# Patient Record
Sex: FEMALE | Race: WHITE | NOT HISPANIC OR LATINO | Employment: OTHER | URBAN - METROPOLITAN AREA
[De-identification: names, ages, dates, MRNs, and addresses within clinical notes are randomized per-mention and may not be internally consistent; named-entity substitution may affect disease eponyms.]

---

## 2021-05-24 DIAGNOSIS — M25.561 RIGHT KNEE PAIN, UNSPECIFIED CHRONICITY: Primary | ICD-10-CM

## 2021-05-26 ENCOUNTER — HOSPITAL ENCOUNTER (OUTPATIENT)
Dept: OTHER | Facility: HOSPITAL | Age: 61
Discharge: HOME | End: 2021-05-26
Attending: ORTHOPAEDIC SURGERY

## 2021-05-26 ENCOUNTER — OFFICE VISIT (OUTPATIENT)
Dept: ORTHOPEDIC SURGERY | Facility: CLINIC | Age: 61
End: 2021-05-26

## 2021-05-26 VITALS — WEIGHT: 229 LBS | HEIGHT: 67 IN | TEMPERATURE: 97.5 F | BODY MASS INDEX: 35.94 KG/M2

## 2021-05-26 DIAGNOSIS — M25.562 LEFT KNEE PAIN, UNSPECIFIED CHRONICITY: Primary | ICD-10-CM

## 2021-05-26 PROCEDURE — 99203 OFFICE O/P NEW LOW 30 MIN: CPT | Performed by: ORTHOPAEDIC SURGERY

## 2021-05-26 RX ORDER — TRAZODONE HYDROCHLORIDE 150 MG/1
TABLET ORAL
COMMUNITY
End: 2021-08-26 | Stop reason: SDUPTHER

## 2021-05-26 RX ORDER — LISINOPRIL 10 MG/1
TABLET ORAL
COMMUNITY
Start: 2021-05-07 | End: 2021-08-26 | Stop reason: SDUPTHER

## 2021-05-26 RX ORDER — TRAZODONE HYDROCHLORIDE 150 MG/1
TABLET ORAL
COMMUNITY
Start: 2021-04-26 | End: 2021-05-26 | Stop reason: SDUPTHER

## 2021-05-26 RX ORDER — MILNACIPRAN HYDROCHLORIDE 100 MG/1
TABLET, FILM COATED ORAL
COMMUNITY
Start: 2021-05-19 | End: 2021-08-26 | Stop reason: SDUPTHER

## 2021-05-26 RX ORDER — GABAPENTIN 100 MG/1
200 CAPSULE ORAL 2 TIMES DAILY
COMMUNITY
Start: 2021-05-14

## 2021-05-26 RX ORDER — PRIMIDONE 50 MG/1
TABLET ORAL
COMMUNITY
Start: 2021-05-19 | End: 2021-08-26 | Stop reason: SDUPTHER

## 2021-05-26 RX ORDER — MELOXICAM 7.5 MG/1
TABLET ORAL
Qty: 40 TABLET | Refills: 3 | Status: SHIPPED | OUTPATIENT
Start: 2021-05-26 | End: 2021-08-11 | Stop reason: SDUPTHER

## 2021-05-26 RX ORDER — OMEPRAZOLE 40 MG/1
CAPSULE, DELAYED RELEASE ORAL
COMMUNITY
Start: 2021-03-22 | End: 2021-08-26 | Stop reason: SDUPTHER

## 2021-05-26 RX ORDER — ACETAMINOPHEN AND CODEINE PHOSPHATE 300; 15 MG/1; MG/1
TABLET ORAL
COMMUNITY
Start: 2021-05-20

## 2021-05-26 RX ORDER — ALBUTEROL SULFATE 90 UG/1
AEROSOL, METERED RESPIRATORY (INHALATION)
COMMUNITY
Start: 2021-05-19 | End: 2021-08-26 | Stop reason: SDUPTHER

## 2021-05-26 RX ORDER — DICLOFENAC SODIUM 20 MG/G
2 SOLUTION TOPICAL 2 TIMES DAILY PRN
Qty: 112 G | Refills: 3 | Status: SHIPPED | OUTPATIENT
Start: 2021-05-26 | End: 2022-03-10

## 2021-05-26 RX ORDER — TIZANIDINE 4 MG/1
8 TABLET ORAL NIGHTLY
COMMUNITY
Start: 2021-04-26

## 2021-05-26 RX ORDER — MONTELUKAST SODIUM 10 MG/1
TABLET ORAL
COMMUNITY
Start: 2021-05-11 | End: 2021-08-26 | Stop reason: SDUPTHER

## 2021-05-26 RX ORDER — MELOXICAM 7.5 MG/1
7.5 TABLET ORAL DAILY
Qty: 40 TABLET | Refills: 3 | Status: CANCELLED | OUTPATIENT
Start: 2021-05-26

## 2021-05-26 RX ORDER — NALOXONE HYDROCHLORIDE 4 MG/.1ML
SPRAY NASAL
COMMUNITY
Start: 2021-05-14

## 2021-05-26 RX ORDER — FLUTICASONE PROPIONATE 50 MCG
SPRAY, SUSPENSION (ML) NASAL
COMMUNITY
Start: 2021-05-11 | End: 2021-08-26 | Stop reason: SDUPTHER

## 2021-06-04 PROBLEM — M25.562 LEFT KNEE PAIN: Status: ACTIVE | Noted: 2021-06-04

## 2021-08-11 DIAGNOSIS — M25.562 LEFT KNEE PAIN, UNSPECIFIED CHRONICITY: ICD-10-CM

## 2021-08-11 RX ORDER — MELOXICAM 7.5 MG/1
TABLET ORAL
Qty: 40 TABLET | Refills: 3 | Status: SHIPPED | OUTPATIENT
Start: 2021-08-11 | End: 2021-08-26 | Stop reason: SDUPTHER

## 2021-08-26 ENCOUNTER — OFFICE VISIT (OUTPATIENT)
Dept: ORTHOPEDIC SURGERY | Facility: CLINIC | Age: 61
End: 2021-08-26

## 2021-08-26 VITALS — BODY MASS INDEX: 36.88 KG/M2 | WEIGHT: 235 LBS | TEMPERATURE: 97.8 F | HEIGHT: 67 IN

## 2021-08-26 DIAGNOSIS — M25.562 LEFT KNEE PAIN, UNSPECIFIED CHRONICITY: Primary | ICD-10-CM

## 2021-08-26 PROCEDURE — 99214 OFFICE O/P EST MOD 30 MIN: CPT | Performed by: ORTHOPAEDIC SURGERY

## 2021-08-26 RX ORDER — FLUTICASONE PROPIONATE 50 MCG
2 SPRAY, SUSPENSION (ML) NASAL DAILY
COMMUNITY
Start: 2021-08-11

## 2021-08-26 RX ORDER — ALBUTEROL SULFATE 90 UG/1
2 AEROSOL, METERED RESPIRATORY (INHALATION) EVERY 4 HOURS PRN
COMMUNITY
Start: 2021-03-03

## 2021-08-26 RX ORDER — MILNACIPRAN HYDROCHLORIDE 100 MG/1
100 TABLET, FILM COATED ORAL
COMMUNITY
Start: 2021-07-31 | End: 2022-03-10

## 2021-08-26 RX ORDER — PRIMIDONE 50 MG/1
50 TABLET ORAL 2 TIMES DAILY
COMMUNITY

## 2021-08-26 RX ORDER — OMEPRAZOLE 40 MG/1
40 CAPSULE, DELAYED RELEASE ORAL DAILY
COMMUNITY
Start: 2021-08-06

## 2021-08-26 RX ORDER — FLECAINIDE ACETATE 50 MG/1
50 TABLET ORAL 2 TIMES DAILY
COMMUNITY
Start: 2021-05-29

## 2021-08-26 RX ORDER — MELOXICAM 7.5 MG/1
TABLET ORAL
COMMUNITY
Start: 2021-08-11 | End: 2021-11-01

## 2021-08-26 RX ORDER — LISINOPRIL 10 MG/1
10 TABLET ORAL DAILY
COMMUNITY
Start: 2021-08-11

## 2021-08-26 RX ORDER — TRAZODONE HYDROCHLORIDE 150 MG/1
50 TABLET ORAL NIGHTLY PRN
Status: ON HOLD | COMMUNITY
Start: 2021-06-30 | End: 2022-03-11

## 2021-08-26 RX ORDER — MONTELUKAST SODIUM 10 MG/1
10 TABLET ORAL NIGHTLY
COMMUNITY
Start: 2021-03-03

## 2021-09-01 RX ORDER — PREGABALIN 75 MG/1
150 CAPSULE ORAL ONCE
Status: CANCELLED | OUTPATIENT
Start: 2022-02-11 | End: 2021-09-01

## 2021-09-01 RX ORDER — CHLORHEXIDINE GLUCONATE 500 MG/1
CLOTH TOPICAL 2 TIMES DAILY
Status: CANCELLED | OUTPATIENT
Start: 2021-09-01

## 2021-09-01 RX ORDER — CEFAZOLIN SODIUM 2 G/100ML
2 INJECTION, SOLUTION INTRAVENOUS ONCE
Status: CANCELLED | OUTPATIENT
Start: 2022-02-11 | End: 2021-09-01

## 2021-09-01 RX ORDER — MELOXICAM 7.5 MG/1
15 TABLET ORAL ONCE
Status: CANCELLED | OUTPATIENT
Start: 2022-02-11 | End: 2021-09-01

## 2021-09-01 RX ORDER — ACETAMINOPHEN 325 MG/1
1000 TABLET ORAL ONCE
Status: CANCELLED | OUTPATIENT
Start: 2022-02-11 | End: 2021-09-01

## 2021-10-05 PROBLEM — R74.8 ELEVATED ALKALINE PHOSPHATASE LEVEL: Status: ACTIVE | Noted: 2017-08-17

## 2021-10-05 PROBLEM — M62.81 HAND MUSCLE WEAKNESS: Status: ACTIVE | Noted: 2020-05-07

## 2021-10-05 PROBLEM — R39.13 INTERMITTENT URINARY STREAM: Status: ACTIVE | Noted: 2020-12-14

## 2021-10-05 PROBLEM — F09 COGNITIVE DYSFUNCTION: Status: ACTIVE | Noted: 2018-04-11

## 2021-10-05 PROBLEM — G51.4 EYELID MYOKYMIA: Status: ACTIVE | Noted: 2017-02-27

## 2021-10-05 PROBLEM — F32.9 MDD (MAJOR DEPRESSIVE DISORDER): Status: ACTIVE | Noted: 2018-01-23

## 2021-10-05 PROBLEM — J15.9 BACTERIAL PNEUMONIA: Status: ACTIVE | Noted: 2018-03-08

## 2021-10-05 PROBLEM — M53.3 SACROILIAC JOINT PAIN: Status: ACTIVE | Noted: 2018-12-17

## 2021-10-05 PROBLEM — G60.8: Status: ACTIVE | Noted: 2018-04-11

## 2021-10-05 PROBLEM — R49.0 DYSPHONIA: Status: ACTIVE | Noted: 2018-01-10

## 2021-10-05 PROBLEM — G47.33 OBSTRUCTIVE SLEEP APNEA SYNDROME: Status: ACTIVE | Noted: 2019-07-29

## 2021-10-05 PROBLEM — N39.41 URGENCY INCONTINENCE: Status: ACTIVE | Noted: 2020-12-14

## 2021-10-05 PROBLEM — U07.1 COVID-19: Status: ACTIVE | Noted: 2021-02-23

## 2021-10-05 PROBLEM — E34.9 ELEVATED PARATHYROID HORMONE: Status: ACTIVE | Noted: 2020-06-16

## 2021-10-05 PROBLEM — N64.4 BREAST PAIN: Status: ACTIVE | Noted: 2019-04-29

## 2021-10-05 PROBLEM — R06.2 WHEEZING: Status: ACTIVE | Noted: 2021-05-24

## 2021-10-05 PROBLEM — B00.1 RECURRENT COLD SORES: Status: ACTIVE | Noted: 2017-02-24

## 2021-10-05 PROBLEM — E66.9 OBESITY: Status: ACTIVE | Noted: 2021-04-12

## 2021-10-05 PROBLEM — R13.10 DYSPHAGIA: Status: ACTIVE | Noted: 2021-03-03

## 2021-10-05 PROBLEM — R47.01 APHASIA OF UNKNOWN ORIGIN: Status: ACTIVE | Noted: 2021-02-23

## 2021-10-05 PROBLEM — R49.8 VOCAL TREMOR: Status: ACTIVE | Noted: 2017-12-07

## 2021-10-05 PROBLEM — R79.89 ELEVATED PARATHYROID HORMONE: Status: ACTIVE | Noted: 2020-06-16

## 2021-10-05 PROBLEM — F41.9 ANXIETY: Status: ACTIVE | Noted: 2018-01-23

## 2021-10-05 PROBLEM — H52.4 BILATERAL PRESBYOPIA: Status: ACTIVE | Noted: 2018-12-17

## 2021-10-05 PROBLEM — R63.1 EXCESSIVE THIRST: Status: ACTIVE | Noted: 2019-10-28

## 2021-10-05 PROBLEM — R39.198 VOIDING DIFFICULTY: Status: ACTIVE | Noted: 2020-12-14

## 2021-10-05 PROBLEM — R35.1 NOCTURIA: Status: ACTIVE | Noted: 2020-12-14

## 2021-10-05 PROBLEM — M19.90 OSTEOARTHRITIS: Status: ACTIVE | Noted: 2019-04-29

## 2021-10-05 PROBLEM — H52.13 BILATERAL MYOPIA: Status: ACTIVE | Noted: 2018-12-17

## 2021-11-01 RX ORDER — MELOXICAM 7.5 MG/1
7.5 TABLET ORAL 2 TIMES DAILY PRN
Qty: 60 TABLET | Refills: 1 | Status: SHIPPED | OUTPATIENT
Start: 2021-11-01 | End: 2022-01-06 | Stop reason: SDUPTHER

## 2021-11-22 ENCOUNTER — TELEPHONE (OUTPATIENT)
Dept: ORTHOPEDIC SURGERY | Facility: CLINIC | Age: 61
End: 2021-11-22

## 2021-11-22 NOTE — TELEPHONE ENCOUNTER
Caller: Renetta Mak    Relationship to patient: Self    Best call back number: [phone]    Patient is needing: PATIENT IS CALLING TO SCHEDULE HER KNEE SURGERY

## 2022-01-06 DIAGNOSIS — M25.562 LEFT KNEE PAIN, UNSPECIFIED CHRONICITY: Primary | ICD-10-CM

## 2022-01-06 RX ORDER — MELOXICAM 7.5 MG/1
TABLET ORAL
Qty: 60 TABLET | Refills: 1 | Status: SHIPPED | OUTPATIENT
Start: 2022-01-06 | End: 2022-03-12 | Stop reason: HOSPADM

## 2022-02-18 ENCOUNTER — TELEPHONE (OUTPATIENT)
Dept: ORTHOPEDIC SURGERY | Facility: CLINIC | Age: 62
End: 2022-02-18

## 2022-02-18 NOTE — TELEPHONE ENCOUNTER
Caller:NANETTE     Relationship:  [facility]    Best call back number: [phone] OPTION 5    Requested Prescriptions: MELOXICAM: SEE DETAILS BELOW  Requested Prescriptions      No prescriptions requested or ordered in this encounter        Pharmacy where request should be sent:  [facility]:    Additional details provided by patient: PHARMACIST STATES PER PATIENTS INSURANCE: PATIENTS RX STRENGTH MUST BE CHANGED BASED ON FOLLOWING INFO: NEEDS TO BE PRESCRIBED #30- 15 MG MELOXICAM: TAKING EITHER 1 TAB DAILY OR 1/2 TAB TWICE DAILY: INSURANCE WILL NO LONGER COVER 7.5 STRENGTH: IF GIVING A VERBAL ORDER: WILL NEED TO SPEAK WITH THE PHARMACIST     Does the patient have less than a 3 day supply:  [x] Yes  [] No    Barb Ling   02/18/22 13:47 EST

## 2022-02-21 DIAGNOSIS — Z96.652 S/P TKR (TOTAL KNEE REPLACEMENT), LEFT: Primary | ICD-10-CM

## 2022-02-25 ENCOUNTER — TELEPHONE (OUTPATIENT)
Dept: ORTHOPEDIC SURGERY | Facility: CLINIC | Age: 62
End: 2022-02-25

## 2022-02-25 NOTE — TELEPHONE ENCOUNTER
HUB STAFF ATTEMPTED NON-CLINICAL WARM TRANSFER - NO ANSWER     Caller: Renetta Mak    Relationship: Self    Best call back number: [phone]     Who are you requesting to speak with (clinical staff, provider, specific staff member):   PROCEDURE      What was the call regarding: PATIENT STATED HER 02/11/22 LEFT TOTAL KNEE REPLACEMENT WAS RESCHEDULED TO 03/11/22 - PATIENT NEEDS A NEW PRE-ADMISSION TESTING DATE & TO ALSO HAVE INITIAL 2 WEEK POST OP RESCHEDULED FROM TODAY 02/25/22 @ 1100 TO AROUND 03/25/22    Do you require a callback: YES     THANKS

## 2022-03-08 ENCOUNTER — TELEPHONE (OUTPATIENT)
Dept: ORTHOPEDIC SURGERY | Facility: CLINIC | Age: 62
End: 2022-03-08

## 2022-03-08 NOTE — TELEPHONE ENCOUNTER
PATIENT STATES THAT SHE HAS HAD BRONCHITIS OVER THE LAST WEEK BUT THAT SHE FEELS LIKE SHE IS DOING BETTER. SHE IS WONDERING WHAT SHE SHOULD DO. I ADVISED HER THAT IF SHE IS FEELING BETTER TO GO ON TO HER PRE-OP APPOINTMENT AT [facility] ON Thursday AND THAT IF THEY FEEL THAT SHE IS NOT SAFE TO PROCEED THEN WE WILL GET HER RESCHEDULED. SHE HAS BEEN ON A STEROID PACK, WILL THAT AFFECT HER PROCEEDING?/AW

## 2022-03-10 ENCOUNTER — PRE-ADMISSION TESTING (OUTPATIENT)
Dept: PREADMISSION TESTING | Facility: HOSPITAL | Age: 62
End: 2022-03-10

## 2022-03-10 ENCOUNTER — HOSPITAL ENCOUNTER (OUTPATIENT)
Dept: GENERAL RADIOLOGY | Facility: HOSPITAL | Age: 62
Discharge: HOME | End: 2022-03-10

## 2022-03-10 VITALS
TEMPERATURE: 98.1 F | WEIGHT: 239 LBS | BODY MASS INDEX: 37.51 KG/M2 | RESPIRATION RATE: 18 BRPM | HEART RATE: 101 BPM | SYSTOLIC BLOOD PRESSURE: 138 MMHG | OXYGEN SATURATION: 97 % | HEIGHT: 67 IN | DIASTOLIC BLOOD PRESSURE: 78 MMHG

## 2022-03-10 DIAGNOSIS — Z96.652 S/P TKR (TOTAL KNEE REPLACEMENT), LEFT: ICD-10-CM

## 2022-03-10 DIAGNOSIS — M25.561 RIGHT KNEE PAIN, UNSPECIFIED CHRONICITY: ICD-10-CM

## 2022-03-10 DIAGNOSIS — M25.562 LEFT KNEE PAIN, UNSPECIFIED CHRONICITY: ICD-10-CM

## 2022-03-10 LAB
ANION GAP SERPL CALCULATED.3IONS-SCNC: 9.9 MMOL/L (ref 5–15)
BUN SERPL-MCNC: 16 MG/DL (ref 8–23)
BUN/CREAT SERPL: 15.7 (ref 7–25)
CALCIUM SPEC-SCNC: 9 MG/DL (ref 8.6–10.5)
CHLORIDE SERPL-SCNC: 105 MMOL/L (ref 98–107)
CO2 SERPL-SCNC: 25.1 MMOL/L (ref 22–29)
CREAT SERPL-MCNC: 1.02 MG/DL (ref 0.57–1)
DEPRECATED RDW RBC AUTO: 42.4 FL (ref 37–54)
EGFRCR SERPLBLD CKD-EPI 2021: 61.9 ML/MIN/1.73
ERYTHROCYTE [DISTWIDTH] IN BLOOD BY AUTOMATED COUNT: 12.2 % (ref 12.3–15.4)
GLUCOSE SERPL-MCNC: 52 MG/DL (ref 65–99)
HCT VFR BLD AUTO: 37.9 % (ref 34–46.6)
HGB BLD-MCNC: 12.3 G/DL (ref 12–15.9)
MCH RBC QN AUTO: 30.4 PG (ref 26.6–33)
MCHC RBC AUTO-ENTMCNC: 32.5 G/DL (ref 31.5–35.7)
MCV RBC AUTO: 93.6 FL (ref 79–97)
PLATELET # BLD AUTO: 367 10*3/MM3 (ref 140–450)
PMV BLD AUTO: 9.2 FL (ref 6–12)
POTASSIUM SERPL-SCNC: 3.9 MMOL/L (ref 3.5–5.2)
QT INTERVAL: 388 MS
RBC # BLD AUTO: 4.05 10*6/MM3 (ref 3.77–5.28)
SARS-COV-2 ORF1AB RESP QL NAA+PROBE: NOT DETECTED
SODIUM SERPL-SCNC: 140 MMOL/L (ref 136–145)
WBC NRBC COR # BLD: 7.51 10*3/MM3 (ref 3.4–10.8)

## 2022-03-10 PROCEDURE — 93005 ELECTROCARDIOGRAM TRACING: CPT

## 2022-03-10 PROCEDURE — 73560 X-RAY EXAM OF KNEE 1 OR 2: CPT

## 2022-03-10 PROCEDURE — 85027 COMPLETE CBC AUTOMATED: CPT

## 2022-03-10 PROCEDURE — C9803 HOPD COVID-19 SPEC COLLECT: HCPCS

## 2022-03-10 PROCEDURE — 36415 COLL VENOUS BLD VENIPUNCTURE: CPT

## 2022-03-10 PROCEDURE — 93010 ELECTROCARDIOGRAM REPORT: CPT | Performed by: INTERNAL MEDICINE

## 2022-03-10 PROCEDURE — U0004 COV-19 TEST NON-CDC HGH THRU: HCPCS

## 2022-03-10 PROCEDURE — 80048 BASIC METABOLIC PNL TOTAL CA: CPT

## 2022-03-10 RX ORDER — BUDESONIDE 0.5 MG/2ML
0.5 INHALANT ORAL DAILY
COMMUNITY

## 2022-03-10 RX ORDER — ALBUTEROL SULFATE 90 UG/1
2 AEROSOL, METERED RESPIRATORY (INHALATION) DAILY
Status: ON HOLD | COMMUNITY
End: 2022-03-11

## 2022-03-10 RX ORDER — DESVENLAFAXINE 100 MG/1
100 TABLET, EXTENDED RELEASE ORAL EVERY EVENING
COMMUNITY

## 2022-03-10 RX ORDER — MORPHINE SULFATE/PF 1 MG/2 ML
SYRINGE (ML) INTRAVENOUS
COMMUNITY

## 2022-03-10 RX ORDER — CHLORHEXIDINE GLUCONATE 500 MG/1
CLOTH TOPICAL 2 TIMES DAILY
Status: ACTIVE | OUTPATIENT
Start: 2022-03-10

## 2022-03-10 RX ORDER — CETIRIZINE HYDROCHLORIDE 10 MG/1
10 TABLET ORAL DAILY PRN
COMMUNITY

## 2022-03-10 ASSESSMENT — KOOS JR
KOOS JR SCORE: 36.931
KOOS JR SCORE: 20

## 2022-03-10 NOTE — DISCHARGE INSTRUCTIONS
Take the following medications the morning of surgery:  OMEPRAZOLE,GABAPENTIN,FLECAINIDE,PRIMADONE  USE INHALER AND BRING WITH YOU    If you are on prescription narcotic pain medication to control your pain you may also take that medication the morning of surgery.    General Instructions:  Do not eat solid food after midnight the night before surgery.  You may drink clear liquids day of surgery but must stop at least one hour before your hospital arrival time.  CLEAR LIQUIDS UNTIL 8:00 AM  It is beneficial for you to have a clear drink that contains carbohydrates the day of surgery.  We suggest a 12 to 20 ounce bottle of Gatorade or Powerade for non-diabetic patients or a 12 to 20 ounce bottle of G2 or Powerade Zero for diabetic patients. (Pediatric patients, are not advised to drink a 12 to 20 ounce carbohydrate drink)    Clear liquids are liquids you can see through.  Nothing red in color.     Plain water                               Sports drinks  Sodas                                   Gelatin (Jell-O)  Fruit juices without pulp such as white grape juice and apple juice  Popsicles that contain no fruit or yogurt  Tea or coffee (no cream or milk added)  Gatorade / Powerade  G2 / Powerade Zero    Infants may have breast milk up to four hours before surgery.  Infants drinking formula may drink formula up to six hours before surgery.   Patients who avoid smoking, chewing tobacco and alcohol for 4 weeks prior to surgery have a reduced risk of post-operative complications.  Quit smoking as many days before surgery as you can.  Do not smoke, use chewing tobacco or drink alcohol the day of surgery.   If applicable bring your C-PAP/ BI-PAP machine.  Bring any papers given to you in the doctor’s office.  Wear clean comfortable clothes.  Do not wear contact lenses, false eyelashes or make-up.  Bring a case for your glasses.   Bring crutches or walker if applicable.  Remove all piercings.  Leave jewelry and any other  valuables at home.  Hair extensions with metal clips must be removed prior to surgery.  The Pre-Admission Testing nurse will instruct you to bring medications if unable to obtain an accurate list in Pre-Admission Testing.        If you were given a blood bank ID arm band remember to bring it with you the day of surgery.    Preventing a Surgical Site Infection:  For 2 to 3 days before surgery, avoid shaving with a razor because the razor can irritate skin and make it easier to develop an infection.    Any areas of open skin can increase the risk of a post-operative wound infection by allowing bacteria to enter and travel throughout the body.  Notify your surgeon if you have any skin wounds / rashes even if it is not near the expected surgical site.  The area will need assessed to determine if surgery should be delayed until it is healed.  The night prior to surgery shower using a fresh bar of anti-bacterial soap (such as Dial) and clean washcloth.  Sleep in a clean bed with clean clothing.  Do not allow pets to sleep with you.  Shower on the morning of surgery using a fresh bar of anti-bacterial soap (such as Dial) and clean washcloth.  Dry with a clean towel and dress in clean clothing.  Ask your surgeon if you will be receiving antibiotics prior to surgery.  Make sure you, your family, and all healthcare providers clean their hands with soap and water or an alcohol based hand  before caring for you or your wound.    Day of surgery: 3/11/2022 ARRIVAL TIME 9:00 AM  Your arrival time is approximately two hours before your scheduled surgery time.  Upon arrival, a Pre-op nurse and Anesthesiologist will review your health history, obtain vital signs, and answer questions you may have.  The only belongings needed at this time will be a list of your home medications and if applicable your C-PAP/BI-PAP machine.  A Pre-op nurse will start an IV and you may receive medication in preparation for surgery, including  something to help you relax.     Please be aware that surgery does come with discomfort.  We want to make every effort to control your discomfort so please discuss any uncontrolled symptoms with your nurse.   Your doctor will most likely have prescribed pain medications.      If you are going home after surgery you will receive individualized written care instructions before being discharged.  A responsible adult must drive you to and from the hospital on the day of your surgery and stay with you for 24 hours.  Discharge prescriptions can be filled by the hospital pharmacy during regular pharmacy hours.  If you are having surgery late in the day/evening your prescription may be e-prescribed to your pharmacy.  Please verify your pharmacy hours or chose a 24 hour pharmacy to avoid not having access to your prescription because your pharmacy has closed for the day.    If you are staying overnight following surgery, you will be transported to your hospital room following the recovery period.  [facility] has all private rooms.    If you have any questions please call Pre-Admission Testing at [phone].  Deductibles and co-payments are collected on the day of service. Please be prepared to pay the required co-pay, deductible or deposit on the day of service as defined by your plan.    Patient Education for Self-Quarantine Process    Following your COVID testing, we strongly recommend that you wear a mask when you are with other people and practice social distancing.   Limit your activities to only required outings.  Wash your hands with soap and water frequently for at least 20 seconds.   Avoid touching your eyes, nose and mouth with unwashed hands.  Do not share anything - utensils, drinking glasses, food from the same bowl.   Sanitize household surfaces daily. Include all high touch areas (door handles, light switches, phones, countertops, etc.)    Call your surgeon immediately if you experience any  of the following symptoms:  Sore Throat  Shortness of Breath or difficulty breathing  Cough  Chills  Body soreness or muscle pain  Headache  Fever  New loss of taste or smell  Do not arrive for your surgery ill.  Your procedure will need to be rescheduled to another time.  You will need to call your physician before the day of surgery to avoid any unnecessary exposure to hospital staff as well as other patients.   CHLORHEXIDINE CLOTH INSTRUCTIONS  The morning of surgery follow these instructions using the Chlorhexidine cloths you've been given.  These steps reduce bacteria on the body.  Do not use the cloths near your eyes, ears mouth, genitalia or on open wounds.  Throw the cloths away after use but do not try to flush them down a toilet.      Open and remove one cloth at a time from the package.    Leave the cloth unfolded and begin the bathing.  Massage the skin with the cloths using gentle pressure to remove bacteria.  Do not scrub harshly.   Follow the steps below with one 2% CHG cloth per area (6 total cloths).  One cloth for neck, shoulders and chest.  One cloth for both arms, hands, fingers and underarms (do underarms last).  One cloth for the abdomen followed by groin.  One cloth for right leg and foot including between the toes.  One cloth for left leg and foot including between the toes.  The last cloth is to be used for the back of the neck, back and buttocks.    Allow the CHG to air dry 3 minutes on the skin which will give it time to work and decrease the chance of irritation.  The skin may feel sticky until it is dry.  Do not rinse with water or any other liquid or you will lose the beneficial effects of the CHG.  If mild skin irritation occurs, do rinse the skin to remove the CHG.  Report this to the nurse at time of admission.  Do not apply lotions, creams, ointments, deodorants or perfumes after using the clothes. Dress in clean clothes before coming to the hospital.    BACTROBAN NASAL  OINTMENT  There are many germs normally in your nose. Bactroban is an ointment that will help reduce these germs. Please follow these instructions for Bactroban use:      ____The day before surgery in the morning  Date__3/10______    ____The day before surgery in the evening              Date___3/10_____    ____The day of surgery in the morning    Date__3/11______    **Squirt ½ package of Bactroban Ointment onto a cotton applicator and apply to inside of 1st nostril.  Squirt the remaining Bactroban and apply to the inside of the other nostril.

## 2022-03-11 ENCOUNTER — APPOINTMENT (OUTPATIENT)
Dept: GENERAL RADIOLOGY | Facility: HOSPITAL | Age: 62
End: 2022-03-11

## 2022-03-11 ENCOUNTER — ANESTHESIA (OUTPATIENT)
Dept: PERIOP | Facility: HOSPITAL | Age: 62
End: 2022-03-11

## 2022-03-11 ENCOUNTER — HOSPITAL ENCOUNTER (OUTPATIENT)
Facility: HOSPITAL | Age: 62
Discharge: HOME | End: 2022-03-12
Attending: ORTHOPAEDIC SURGERY | Admitting: ORTHOPAEDIC SURGERY

## 2022-03-11 ENCOUNTER — ANESTHESIA EVENT (OUTPATIENT)
Dept: PERIOP | Facility: HOSPITAL | Age: 62
End: 2022-03-11

## 2022-03-11 DIAGNOSIS — Z96.652 S/P TKR (TOTAL KNEE REPLACEMENT), LEFT: Primary | ICD-10-CM

## 2022-03-11 DIAGNOSIS — M25.562 LEFT KNEE PAIN, UNSPECIFIED CHRONICITY: ICD-10-CM

## 2022-03-11 PROCEDURE — 25010000002 FENTANYL CITRATE (PF) 50 MCG/ML SOLUTION: Performed by: ANESTHESIOLOGY

## 2022-03-11 PROCEDURE — S0260 H&P FOR SURGERY: HCPCS | Performed by: ORTHOPAEDIC SURGERY

## 2022-03-11 PROCEDURE — 25010000002 CEFAZOLIN IN DEXTROSE 2-4 GM/100ML-% SOLUTION: Performed by: ORTHOPAEDIC SURGERY

## 2022-03-11 PROCEDURE — C1889 IMPLANT/INSERT DEVICE, NOC: HCPCS | Performed by: ORTHOPAEDIC SURGERY

## 2022-03-11 PROCEDURE — 25010000002 CEFAZOLIN PER 500 MG: Performed by: ORTHOPAEDIC SURGERY

## 2022-03-11 PROCEDURE — 25010000002 ROPIVACAINE PER 1 MG: Performed by: ANESTHESIOLOGY

## 2022-03-11 PROCEDURE — 25010000002 HYDROMORPHONE PER 4 MG: Performed by: NURSE ANESTHETIST, CERTIFIED REGISTERED

## 2022-03-11 PROCEDURE — 20985 CPTR-ASST DIR MS PX: CPT | Performed by: ORTHOPAEDIC SURGERY

## 2022-03-11 PROCEDURE — 25010000002 DEXAMETHASONE PER 1 MG: Performed by: NURSE ANESTHETIST, CERTIFIED REGISTERED

## 2022-03-11 PROCEDURE — 25010000002 ROPIVACAINE PER 1 MG: Performed by: ORTHOPAEDIC SURGERY

## 2022-03-11 PROCEDURE — 27447 TOTAL KNEE ARTHROPLASTY: CPT | Performed by: ORTHOPAEDIC SURGERY

## 2022-03-11 PROCEDURE — 0 BUPIVACAINE LIPOSOME 1.3 % SUSPENSION: Performed by: ORTHOPAEDIC SURGERY

## 2022-03-11 PROCEDURE — C1713 ANCHOR/SCREW BN/BN,TIS/BN: HCPCS | Performed by: ORTHOPAEDIC SURGERY

## 2022-03-11 PROCEDURE — 25010000002 ONDANSETRON PER 1 MG: Performed by: NURSE ANESTHETIST, CERTIFIED REGISTERED

## 2022-03-11 PROCEDURE — G0378 HOSPITAL OBSERVATION PER HR: HCPCS

## 2022-03-11 PROCEDURE — C1776 JOINT DEVICE (IMPLANTABLE): HCPCS | Performed by: ORTHOPAEDIC SURGERY

## 2022-03-11 PROCEDURE — 25010000002 NEOSTIGMINE 5 MG/10ML SOLUTION: Performed by: NURSE ANESTHETIST, CERTIFIED REGISTERED

## 2022-03-11 PROCEDURE — 25010000002 SUCCINYLCHOLINE PER 20 MG: Performed by: NURSE ANESTHETIST, CERTIFIED REGISTERED

## 2022-03-11 PROCEDURE — 73560 X-RAY EXAM OF KNEE 1 OR 2: CPT

## 2022-03-11 PROCEDURE — C9290 INJ, BUPIVACAINE LIPOSOME: HCPCS | Performed by: ORTHOPAEDIC SURGERY

## 2022-03-11 PROCEDURE — 76942 ECHO GUIDE FOR BIOPSY: CPT | Performed by: ORTHOPAEDIC SURGERY

## 2022-03-11 PROCEDURE — 25010000002 FENTANYL CITRATE (PF) 50 MCG/ML SOLUTION: Performed by: NURSE ANESTHETIST, CERTIFIED REGISTERED

## 2022-03-11 PROCEDURE — 25010000002 MIDAZOLAM PER 1 MG: Performed by: ANESTHESIOLOGY

## 2022-03-11 PROCEDURE — 25010000002 PROPOFOL 10 MG/ML EMULSION: Performed by: NURSE ANESTHETIST, CERTIFIED REGISTERED

## 2022-03-11 DEVICE — PAT KN PERSONA VE CRS/LNK CMT 9.5X38MM: Type: IMPLANTABLE DEVICE | Site: KNEE | Status: FUNCTIONAL

## 2022-03-11 DEVICE — VIOLET ANTIBACTERIAL POLYDIOXANONE, KNOTLESS TISSUE CONTROL DEVICE
Type: IMPLANTABLE DEVICE | Site: KNEE | Status: FUNCTIONAL
Brand: STRATAFIX

## 2022-03-11 DEVICE — IMPLANTABLE DEVICE
Type: IMPLANTABLE DEVICE | Site: KNEE | Status: FUNCTIONAL
Brand: PERSONA® NATURAL TIBIA®

## 2022-03-11 DEVICE — COMP FEM/KN PERSONA CMT NRW COCR SZ10 LT: Type: IMPLANTABLE DEVICE | Site: KNEE | Status: FUNCTIONAL

## 2022-03-11 DEVICE — SCRW HEX PERSONA 3.5X3.2X33MM: Type: IMPLANTABLE DEVICE | Site: KNEE | Status: FUNCTIONAL

## 2022-03-11 DEVICE — CMT BONE REFOBACIN R W/GENT 1X40: Type: IMPLANTABLE DEVICE | Site: KNEE | Status: FUNCTIONAL

## 2022-03-11 DEVICE — CAP EXT STEM KN UPCHRG: Type: IMPLANTABLE DEVICE | Site: KNEE | Status: FUNCTIONAL

## 2022-03-11 DEVICE — ART/SRF KN PERSONA/VE PS EF 8TO11 10MM LT: Type: IMPLANTABLE DEVICE | Site: KNEE | Status: FUNCTIONAL

## 2022-03-11 DEVICE — KNOTLESS TISSUE CONTROL DEVICE, VIOLET UNIDIRECTIONAL (ANTIBACTERIAL) SYNTHETIC ABSORBABLE DEVICE
Type: IMPLANTABLE DEVICE | Site: KNEE | Status: FUNCTIONAL
Brand: STRATAFIX

## 2022-03-11 DEVICE — CAP TOTL KN CMT PRIMARY: Type: IMPLANTABLE DEVICE | Site: KNEE | Status: FUNCTIONAL

## 2022-03-11 DEVICE — DEV CONTRL TISS STRATAFIX SPIRAL MNCRYL UD 3/0 PLS 30CM: Type: IMPLANTABLE DEVICE | Site: KNEE | Status: FUNCTIONAL

## 2022-03-11 RX ORDER — FLUTICASONE PROPIONATE 50 MCG
2 SPRAY, SUSPENSION (ML) NASAL DAILY
Status: DISCONTINUED | OUTPATIENT
Start: 2022-03-12 | End: 2022-03-12 | Stop reason: HOSPADM

## 2022-03-11 RX ORDER — SODIUM CHLORIDE, SODIUM LACTATE, POTASSIUM CHLORIDE, CALCIUM CHLORIDE 600; 310; 30; 20 MG/100ML; MG/100ML; MG/100ML; MG/100ML
9 INJECTION, SOLUTION INTRAVENOUS CONTINUOUS
Status: DISCONTINUED | OUTPATIENT
Start: 2022-03-11 | End: 2022-03-11

## 2022-03-11 RX ORDER — GLYCOPYRROLATE 0.2 MG/ML
INJECTION INTRAMUSCULAR; INTRAVENOUS AS NEEDED
Status: DISCONTINUED | OUTPATIENT
Start: 2022-03-11 | End: 2022-03-11 | Stop reason: SURG

## 2022-03-11 RX ORDER — FAMOTIDINE 10 MG/ML
20 INJECTION, SOLUTION INTRAVENOUS ONCE
Status: COMPLETED | OUTPATIENT
Start: 2022-03-11 | End: 2022-03-11

## 2022-03-11 RX ORDER — ONDANSETRON 2 MG/ML
4 INJECTION INTRAMUSCULAR; INTRAVENOUS EVERY 6 HOURS PRN
Status: DISCONTINUED | OUTPATIENT
Start: 2022-03-11 | End: 2022-03-12 | Stop reason: HOSPADM

## 2022-03-11 RX ORDER — MAGNESIUM HYDROXIDE 1200 MG/15ML
LIQUID ORAL AS NEEDED
Status: DISCONTINUED | OUTPATIENT
Start: 2022-03-11 | End: 2022-03-11 | Stop reason: HOSPADM

## 2022-03-11 RX ORDER — SODIUM CHLORIDE 0.9 % (FLUSH) 0.9 %
3 SYRINGE (ML) INJECTION EVERY 12 HOURS SCHEDULED
Status: DISCONTINUED | OUTPATIENT
Start: 2022-03-11 | End: 2022-03-11 | Stop reason: HOSPADM

## 2022-03-11 RX ORDER — FENTANYL CITRATE 50 UG/ML
50 INJECTION, SOLUTION INTRAMUSCULAR; INTRAVENOUS
Status: DISCONTINUED | OUTPATIENT
Start: 2022-03-11 | End: 2022-03-11 | Stop reason: HOSPADM

## 2022-03-11 RX ORDER — DIPHENHYDRAMINE HCL 25 MG
25 CAPSULE ORAL
Status: DISCONTINUED | OUTPATIENT
Start: 2022-03-11 | End: 2022-03-11 | Stop reason: HOSPADM

## 2022-03-11 RX ORDER — OXYCODONE HYDROCHLORIDE AND ACETAMINOPHEN 5; 325 MG/1; MG/1
1 TABLET ORAL EVERY 4 HOURS PRN
Status: DISCONTINUED | OUTPATIENT
Start: 2022-03-11 | End: 2022-03-12 | Stop reason: HOSPADM

## 2022-03-11 RX ORDER — EPHEDRINE SULFATE 50 MG/ML
5 INJECTION, SOLUTION INTRAVENOUS ONCE AS NEEDED
Status: DISCONTINUED | OUTPATIENT
Start: 2022-03-11 | End: 2022-03-11 | Stop reason: HOSPADM

## 2022-03-11 RX ORDER — ROPIVACAINE HYDROCHLORIDE 5 MG/ML
INJECTION, SOLUTION EPIDURAL; INFILTRATION; PERINEURAL
Status: COMPLETED | OUTPATIENT
Start: 2022-03-11 | End: 2022-03-11

## 2022-03-11 RX ORDER — MIDAZOLAM HYDROCHLORIDE 1 MG/ML
INJECTION INTRAMUSCULAR; INTRAVENOUS
Status: COMPLETED | OUTPATIENT
Start: 2022-03-11 | End: 2022-03-11

## 2022-03-11 RX ORDER — FLECAINIDE ACETATE 50 MG/1
50 TABLET ORAL 2 TIMES DAILY
Status: DISCONTINUED | OUTPATIENT
Start: 2022-03-11 | End: 2022-03-12 | Stop reason: HOSPADM

## 2022-03-11 RX ORDER — ONDANSETRON 2 MG/ML
4 INJECTION INTRAMUSCULAR; INTRAVENOUS ONCE AS NEEDED
Status: DISCONTINUED | OUTPATIENT
Start: 2022-03-11 | End: 2022-03-11 | Stop reason: HOSPADM

## 2022-03-11 RX ORDER — FENTANYL CITRATE 50 UG/ML
INJECTION, SOLUTION INTRAMUSCULAR; INTRAVENOUS AS NEEDED
Status: DISCONTINUED | OUTPATIENT
Start: 2022-03-11 | End: 2022-03-11 | Stop reason: SURG

## 2022-03-11 RX ORDER — FLUMAZENIL 0.1 MG/ML
0.2 INJECTION INTRAVENOUS AS NEEDED
Status: DISCONTINUED | OUTPATIENT
Start: 2022-03-11 | End: 2022-03-11 | Stop reason: HOSPADM

## 2022-03-11 RX ORDER — DESVENLAFAXINE SUCCINATE 50 MG/1
100 TABLET, EXTENDED RELEASE ORAL EVERY EVENING
Status: DISCONTINUED | OUTPATIENT
Start: 2022-03-11 | End: 2022-03-12 | Stop reason: HOSPADM

## 2022-03-11 RX ORDER — ACETAMINOPHEN 325 MG/1
1000 TABLET ORAL ONCE
Status: COMPLETED | OUTPATIENT
Start: 2022-03-11 | End: 2022-03-11

## 2022-03-11 RX ORDER — BISACODYL 10 MG
10 SUPPOSITORY, RECTAL RECTAL DAILY PRN
Status: DISCONTINUED | OUTPATIENT
Start: 2022-03-11 | End: 2022-03-12 | Stop reason: HOSPADM

## 2022-03-11 RX ORDER — SODIUM CHLORIDE 0.9 % (FLUSH) 0.9 %
3-10 SYRINGE (ML) INJECTION AS NEEDED
Status: DISCONTINUED | OUTPATIENT
Start: 2022-03-11 | End: 2022-03-11 | Stop reason: HOSPADM

## 2022-03-11 RX ORDER — HYDROMORPHONE HYDROCHLORIDE 1 MG/ML
0.5 INJECTION, SOLUTION INTRAMUSCULAR; INTRAVENOUS; SUBCUTANEOUS
Status: DISCONTINUED | OUTPATIENT
Start: 2022-03-11 | End: 2022-03-11 | Stop reason: HOSPADM

## 2022-03-11 RX ORDER — ALBUTEROL SULFATE 2.5 MG/3ML
2.5 SOLUTION RESPIRATORY (INHALATION) EVERY 4 HOURS PRN
Status: DISCONTINUED | OUTPATIENT
Start: 2022-03-11 | End: 2022-03-12 | Stop reason: HOSPADM

## 2022-03-11 RX ORDER — DEXTROSE MONOHYDRATE 25 G/50ML
25 INJECTION, SOLUTION INTRAVENOUS
Status: DISCONTINUED | OUTPATIENT
Start: 2022-03-11 | End: 2022-03-12 | Stop reason: HOSPADM

## 2022-03-11 RX ORDER — GABAPENTIN 100 MG/1
200 CAPSULE ORAL 2 TIMES DAILY
Status: DISCONTINUED | OUTPATIENT
Start: 2022-03-11 | End: 2022-03-12 | Stop reason: HOSPADM

## 2022-03-11 RX ORDER — ONDANSETRON 2 MG/ML
INJECTION INTRAMUSCULAR; INTRAVENOUS AS NEEDED
Status: DISCONTINUED | OUTPATIENT
Start: 2022-03-11 | End: 2022-03-11 | Stop reason: SURG

## 2022-03-11 RX ORDER — CEFAZOLIN SODIUM 2 G/100ML
2 INJECTION, SOLUTION INTRAVENOUS EVERY 8 HOURS
Status: COMPLETED | OUTPATIENT
Start: 2022-03-11 | End: 2022-03-12

## 2022-03-11 RX ORDER — PROMETHAZINE HYDROCHLORIDE 25 MG/1
25 SUPPOSITORY RECTAL ONCE AS NEEDED
Status: DISCONTINUED | OUTPATIENT
Start: 2022-03-11 | End: 2022-03-11 | Stop reason: HOSPADM

## 2022-03-11 RX ORDER — BISACODYL 5 MG/1
10 TABLET, DELAYED RELEASE ORAL DAILY PRN
Status: DISCONTINUED | OUTPATIENT
Start: 2022-03-11 | End: 2022-03-12 | Stop reason: HOSPADM

## 2022-03-11 RX ORDER — HYDRALAZINE HYDROCHLORIDE 20 MG/ML
5 INJECTION INTRAMUSCULAR; INTRAVENOUS
Status: DISCONTINUED | OUTPATIENT
Start: 2022-03-11 | End: 2022-03-11 | Stop reason: HOSPADM

## 2022-03-11 RX ORDER — MELOXICAM 7.5 MG/1
15 TABLET ORAL ONCE
Status: DISCONTINUED | OUTPATIENT
Start: 2022-03-11 | End: 2022-03-11 | Stop reason: HOSPADM

## 2022-03-11 RX ORDER — ALBUTEROL SULFATE 90 UG/1
2 AEROSOL, METERED RESPIRATORY (INHALATION) EVERY 4 HOURS PRN
Status: DISCONTINUED | OUTPATIENT
Start: 2022-03-11 | End: 2022-03-11 | Stop reason: CLARIF

## 2022-03-11 RX ORDER — LABETALOL HYDROCHLORIDE 5 MG/ML
INJECTION, SOLUTION INTRAVENOUS AS NEEDED
Status: DISCONTINUED | OUTPATIENT
Start: 2022-03-11 | End: 2022-03-11 | Stop reason: SURG

## 2022-03-11 RX ORDER — OXYCODONE HYDROCHLORIDE AND ACETAMINOPHEN 5; 325 MG/1; MG/1
2 TABLET ORAL EVERY 4 HOURS PRN
Status: DISCONTINUED | OUTPATIENT
Start: 2022-03-11 | End: 2022-03-12 | Stop reason: HOSPADM

## 2022-03-11 RX ORDER — MONTELUKAST SODIUM 10 MG/1
10 TABLET ORAL NIGHTLY
Status: DISCONTINUED | OUTPATIENT
Start: 2022-03-11 | End: 2022-03-12 | Stop reason: HOSPADM

## 2022-03-11 RX ORDER — PROPOFOL 10 MG/ML
VIAL (ML) INTRAVENOUS AS NEEDED
Status: DISCONTINUED | OUTPATIENT
Start: 2022-03-11 | End: 2022-03-11 | Stop reason: SURG

## 2022-03-11 RX ORDER — ROPIVACAINE HYDROCHLORIDE 5 MG/ML
INJECTION, SOLUTION EPIDURAL; INFILTRATION; PERINEURAL AS NEEDED
Status: DISCONTINUED | OUTPATIENT
Start: 2022-03-11 | End: 2022-03-11 | Stop reason: HOSPADM

## 2022-03-11 RX ORDER — OXYCODONE HYDROCHLORIDE AND ACETAMINOPHEN 5; 325 MG/1; MG/1
1-2 TABLET ORAL EVERY 4 HOURS PRN
Qty: 40 TABLET | Refills: 0 | Status: SHIPPED | OUTPATIENT
Start: 2022-03-11 | End: 2022-03-18 | Stop reason: SDUPTHER

## 2022-03-11 RX ORDER — LIDOCAINE HYDROCHLORIDE 20 MG/ML
INJECTION, SOLUTION INFILTRATION; PERINEURAL AS NEEDED
Status: DISCONTINUED | OUTPATIENT
Start: 2022-03-11 | End: 2022-03-11 | Stop reason: SURG

## 2022-03-11 RX ORDER — LABETALOL HYDROCHLORIDE 5 MG/ML
5 INJECTION, SOLUTION INTRAVENOUS
Status: DISCONTINUED | OUTPATIENT
Start: 2022-03-11 | End: 2022-03-11 | Stop reason: HOSPADM

## 2022-03-11 RX ORDER — TIZANIDINE 4 MG/1
8 TABLET ORAL NIGHTLY
Status: DISCONTINUED | OUTPATIENT
Start: 2022-03-11 | End: 2022-03-12 | Stop reason: HOSPADM

## 2022-03-11 RX ORDER — LISINOPRIL 20 MG/1
10 TABLET ORAL NIGHTLY
Status: DISCONTINUED | OUTPATIENT
Start: 2022-03-11 | End: 2022-03-12

## 2022-03-11 RX ORDER — LIDOCAINE HYDROCHLORIDE 10 MG/ML
0.5 INJECTION, SOLUTION EPIDURAL; INFILTRATION; INTRACAUDAL; PERINEURAL ONCE AS NEEDED
Status: DISCONTINUED | OUTPATIENT
Start: 2022-03-11 | End: 2022-03-11 | Stop reason: HOSPADM

## 2022-03-11 RX ORDER — FENTANYL CITRATE 50 UG/ML
100 INJECTION, SOLUTION INTRAMUSCULAR; INTRAVENOUS
Status: DISCONTINUED | OUTPATIENT
Start: 2022-03-11 | End: 2022-03-11 | Stop reason: HOSPADM

## 2022-03-11 RX ORDER — HYDROMORPHONE HCL 110MG/55ML
PATIENT CONTROLLED ANALGESIA SYRINGE INTRAVENOUS AS NEEDED
Status: DISCONTINUED | OUTPATIENT
Start: 2022-03-11 | End: 2022-03-11 | Stop reason: SURG

## 2022-03-11 RX ORDER — POLYETHYLENE GLYCOL 3350 17 G/17G
17 POWDER, FOR SOLUTION ORAL DAILY
Status: DISCONTINUED | OUTPATIENT
Start: 2022-03-11 | End: 2022-03-12 | Stop reason: HOSPADM

## 2022-03-11 RX ORDER — NICOTINE POLACRILEX 4 MG
15 LOZENGE BUCCAL
Status: DISCONTINUED | OUTPATIENT
Start: 2022-03-11 | End: 2022-03-12 | Stop reason: HOSPADM

## 2022-03-11 RX ORDER — OXYCODONE AND ACETAMINOPHEN 7.5; 325 MG/1; MG/1
2 TABLET ORAL EVERY 4 HOURS PRN
Status: DISCONTINUED | OUTPATIENT
Start: 2022-03-11 | End: 2022-03-11 | Stop reason: HOSPADM

## 2022-03-11 RX ORDER — PRIMIDONE 50 MG/1
50 TABLET ORAL 2 TIMES DAILY
Status: DISCONTINUED | OUTPATIENT
Start: 2022-03-11 | End: 2022-03-12 | Stop reason: HOSPADM

## 2022-03-11 RX ORDER — PROMETHAZINE HYDROCHLORIDE 25 MG/1
25 TABLET ORAL ONCE AS NEEDED
Status: DISCONTINUED | OUTPATIENT
Start: 2022-03-11 | End: 2022-03-11 | Stop reason: HOSPADM

## 2022-03-11 RX ORDER — FENTANYL CITRATE 50 UG/ML
INJECTION, SOLUTION INTRAMUSCULAR; INTRAVENOUS
Status: COMPLETED | OUTPATIENT
Start: 2022-03-11 | End: 2022-03-11

## 2022-03-11 RX ORDER — ONDANSETRON 4 MG/1
4 TABLET, FILM COATED ORAL EVERY 6 HOURS PRN
Status: DISCONTINUED | OUTPATIENT
Start: 2022-03-11 | End: 2022-03-12 | Stop reason: HOSPADM

## 2022-03-11 RX ORDER — MIDAZOLAM HYDROCHLORIDE 1 MG/ML
2 INJECTION INTRAMUSCULAR; INTRAVENOUS
Status: DISCONTINUED | OUTPATIENT
Start: 2022-03-11 | End: 2022-03-11 | Stop reason: HOSPADM

## 2022-03-11 RX ORDER — SUCCINYLCHOLINE CHLORIDE 20 MG/ML
INJECTION INTRAMUSCULAR; INTRAVENOUS AS NEEDED
Status: DISCONTINUED | OUTPATIENT
Start: 2022-03-11 | End: 2022-03-11 | Stop reason: SURG

## 2022-03-11 RX ORDER — ROCURONIUM BROMIDE 10 MG/ML
INJECTION, SOLUTION INTRAVENOUS AS NEEDED
Status: DISCONTINUED | OUTPATIENT
Start: 2022-03-11 | End: 2022-03-11 | Stop reason: SURG

## 2022-03-11 RX ORDER — NALOXONE HCL 0.4 MG/ML
0.2 VIAL (ML) INJECTION AS NEEDED
Status: DISCONTINUED | OUTPATIENT
Start: 2022-03-11 | End: 2022-03-11 | Stop reason: HOSPADM

## 2022-03-11 RX ORDER — TRANEXAMIC ACID 100 MG/ML
INJECTION, SOLUTION INTRAVENOUS AS NEEDED
Status: DISCONTINUED | OUTPATIENT
Start: 2022-03-11 | End: 2022-03-11 | Stop reason: SURG

## 2022-03-11 RX ORDER — CETIRIZINE HYDROCHLORIDE 10 MG/1
10 TABLET ORAL DAILY PRN
Status: DISCONTINUED | OUTPATIENT
Start: 2022-03-11 | End: 2022-03-12 | Stop reason: HOSPADM

## 2022-03-11 RX ORDER — DEXAMETHASONE SODIUM PHOSPHATE 10 MG/ML
INJECTION INTRAMUSCULAR; INTRAVENOUS AS NEEDED
Status: DISCONTINUED | OUTPATIENT
Start: 2022-03-11 | End: 2022-03-11 | Stop reason: SURG

## 2022-03-11 RX ORDER — ACETAMINOPHEN 325 MG/1
325 TABLET ORAL EVERY 4 HOURS PRN
Status: DISCONTINUED | OUTPATIENT
Start: 2022-03-11 | End: 2022-03-12 | Stop reason: HOSPADM

## 2022-03-11 RX ORDER — SODIUM CHLORIDE, SODIUM LACTATE, POTASSIUM CHLORIDE, CALCIUM CHLORIDE 600; 310; 30; 20 MG/100ML; MG/100ML; MG/100ML; MG/100ML
75 INJECTION, SOLUTION INTRAVENOUS CONTINUOUS
Status: DISCONTINUED | OUTPATIENT
Start: 2022-03-11 | End: 2022-03-12 | Stop reason: HOSPADM

## 2022-03-11 RX ORDER — PANTOPRAZOLE SODIUM 40 MG/1
40 TABLET, DELAYED RELEASE ORAL EVERY MORNING
Status: DISCONTINUED | OUTPATIENT
Start: 2022-03-12 | End: 2022-03-12 | Stop reason: HOSPADM

## 2022-03-11 RX ORDER — DOCUSATE SODIUM 100 MG/1
100 CAPSULE, LIQUID FILLED ORAL 2 TIMES DAILY
Status: DISCONTINUED | OUTPATIENT
Start: 2022-03-11 | End: 2022-03-12 | Stop reason: HOSPADM

## 2022-03-11 RX ORDER — BUDESONIDE 0.5 MG/2ML
0.5 INHALANT ORAL DAILY
Status: DISCONTINUED | OUTPATIENT
Start: 2022-03-11 | End: 2022-03-12 | Stop reason: HOSPADM

## 2022-03-11 RX ORDER — PREGABALIN 75 MG/1
150 CAPSULE ORAL ONCE
Status: COMPLETED | OUTPATIENT
Start: 2022-03-11 | End: 2022-03-11

## 2022-03-11 RX ORDER — NEOSTIGMINE METHYLSULFATE 0.5 MG/ML
INJECTION, SOLUTION INTRAVENOUS AS NEEDED
Status: DISCONTINUED | OUTPATIENT
Start: 2022-03-11 | End: 2022-03-11 | Stop reason: SURG

## 2022-03-11 RX ORDER — CEFAZOLIN SODIUM 2 G/100ML
2 INJECTION, SOLUTION INTRAVENOUS ONCE
Status: COMPLETED | OUTPATIENT
Start: 2022-03-11 | End: 2022-03-11

## 2022-03-11 RX ORDER — DIPHENHYDRAMINE HYDROCHLORIDE 50 MG/ML
12.5 INJECTION INTRAMUSCULAR; INTRAVENOUS
Status: DISCONTINUED | OUTPATIENT
Start: 2022-03-11 | End: 2022-03-11 | Stop reason: HOSPADM

## 2022-03-11 RX ORDER — ONDANSETRON 4 MG/1
4 TABLET, FILM COATED ORAL EVERY 6 HOURS PRN
Qty: 30 TABLET | Refills: 0 | Status: SHIPPED | OUTPATIENT
Start: 2022-03-11 | End: 2022-05-26

## 2022-03-11 RX ORDER — HYDROCODONE BITARTRATE AND ACETAMINOPHEN 7.5; 325 MG/1; MG/1
1 TABLET ORAL ONCE AS NEEDED
Status: DISCONTINUED | OUTPATIENT
Start: 2022-03-11 | End: 2022-03-11 | Stop reason: HOSPADM

## 2022-03-11 RX ADMIN — TRANEXAMIC ACID 1000 MG: 1 INJECTION, SOLUTION INTRAVENOUS at 14:56

## 2022-03-11 RX ADMIN — SUCCINYLCHOLINE CHLORIDE 160 MG: 20 INJECTION, SOLUTION INTRAMUSCULAR; INTRAVENOUS; PARENTERAL at 12:54

## 2022-03-11 RX ADMIN — HYDROMORPHONE HYDROCHLORIDE 0.5 MG: 1 INJECTION, SOLUTION INTRAMUSCULAR; INTRAVENOUS; SUBCUTANEOUS at 15:14

## 2022-03-11 RX ADMIN — MIDAZOLAM 2 MG: 1 INJECTION INTRAMUSCULAR; INTRAVENOUS at 11:07

## 2022-03-11 RX ADMIN — FLECAINIDE ACETATE TABLET 50 MG: 50 TABLET ORAL at 20:33

## 2022-03-11 RX ADMIN — PRIMIDONE 50 MG: 50 TABLET ORAL at 20:33

## 2022-03-11 RX ADMIN — GLYCOPYRROLATE 0.3 MG: 0.2 INJECTION INTRAMUSCULAR; INTRAVENOUS at 14:56

## 2022-03-11 RX ADMIN — CEFAZOLIN SODIUM 2 G: 2 INJECTION, SOLUTION INTRAVENOUS at 20:32

## 2022-03-11 RX ADMIN — FENTANYL CITRATE 25 MCG: 0.05 INJECTION, SOLUTION INTRAMUSCULAR; INTRAVENOUS at 13:28

## 2022-03-11 RX ADMIN — HYDROMORPHONE HYDROCHLORIDE 0.5 MG: 1 INJECTION, SOLUTION INTRAMUSCULAR; INTRAVENOUS; SUBCUTANEOUS at 15:38

## 2022-03-11 RX ADMIN — LABETALOL HYDROCHLORIDE 5 MG: 5 INJECTION, SOLUTION INTRAVENOUS at 14:17

## 2022-03-11 RX ADMIN — GLYCOPYRROLATE 0.2 MG: 0.2 INJECTION INTRAMUSCULAR; INTRAVENOUS at 13:58

## 2022-03-11 RX ADMIN — POLYETHYLENE GLYCOL 3350 17 G: 17 POWDER, FOR SOLUTION ORAL at 18:16

## 2022-03-11 RX ADMIN — OXYCODONE AND ACETAMINOPHEN 2 TABLET: 5; 325 TABLET ORAL at 19:43

## 2022-03-11 RX ADMIN — GABAPENTIN 200 MG: 100 CAPSULE ORAL at 20:33

## 2022-03-11 RX ADMIN — FENTANYL CITRATE 25 MCG: 0.05 INJECTION, SOLUTION INTRAMUSCULAR; INTRAVENOUS at 13:32

## 2022-03-11 RX ADMIN — DEXAMETHASONE SODIUM PHOSPHATE 8 MG: 10 INJECTION INTRAMUSCULAR; INTRAVENOUS at 12:57

## 2022-03-11 RX ADMIN — ONDANSETRON 4 MG: 2 INJECTION INTRAMUSCULAR; INTRAVENOUS at 14:53

## 2022-03-11 RX ADMIN — HYDROMORPHONE HYDROCHLORIDE 0.5 MG: 2 INJECTION, SOLUTION INTRAMUSCULAR; INTRAVENOUS; SUBCUTANEOUS at 13:39

## 2022-03-11 RX ADMIN — MONTELUKAST SODIUM 10 MG: 10 TABLET, FILM COATED ORAL at 20:33

## 2022-03-11 RX ADMIN — FAMOTIDINE 20 MG: 10 INJECTION INTRAVENOUS at 10:12

## 2022-03-11 RX ADMIN — PREGABALIN 150 MG: 75 CAPSULE ORAL at 10:06

## 2022-03-11 RX ADMIN — DESVENLAFAXINE SUCCINATE 100 MG: 50 TABLET, EXTENDED RELEASE ORAL at 18:16

## 2022-03-11 RX ADMIN — HYDROMORPHONE HYDROCHLORIDE 0.5 MG: 1 INJECTION, SOLUTION INTRAMUSCULAR; INTRAVENOUS; SUBCUTANEOUS at 16:15

## 2022-03-11 RX ADMIN — FENTANYL CITRATE 50 MCG: 50 INJECTION INTRAMUSCULAR; INTRAVENOUS at 15:14

## 2022-03-11 RX ADMIN — ACETAMINOPHEN 975 MG: 325 TABLET ORAL at 10:06

## 2022-03-11 RX ADMIN — LABETALOL HYDROCHLORIDE 5 MG: 5 INJECTION, SOLUTION INTRAVENOUS at 13:55

## 2022-03-11 RX ADMIN — SODIUM CHLORIDE, POTASSIUM CHLORIDE, SODIUM LACTATE AND CALCIUM CHLORIDE: 600; 310; 30; 20 INJECTION, SOLUTION INTRAVENOUS at 14:20

## 2022-03-11 RX ADMIN — FENTANYL CITRATE 50 MCG: 50 INJECTION INTRAMUSCULAR; INTRAVENOUS at 15:38

## 2022-03-11 RX ADMIN — FENTANYL CITRATE 50 MCG: 0.05 INJECTION, SOLUTION INTRAMUSCULAR; INTRAVENOUS at 13:26

## 2022-03-11 RX ADMIN — TIZANIDINE 8 MG: 4 TABLET ORAL at 20:33

## 2022-03-11 RX ADMIN — NEOSTIGMINE METHYLSULFATE 2.5 MG: 0.5 INJECTION INTRAVENOUS at 14:56

## 2022-03-11 RX ADMIN — SODIUM CHLORIDE, POTASSIUM CHLORIDE, SODIUM LACTATE AND CALCIUM CHLORIDE 75 ML/HR: 600; 310; 30; 20 INJECTION, SOLUTION INTRAVENOUS at 18:16

## 2022-03-11 RX ADMIN — CEFAZOLIN SODIUM 2 G: 2 INJECTION, SOLUTION INTRAVENOUS at 12:44

## 2022-03-11 RX ADMIN — ROCURONIUM BROMIDE 30 MG: 50 INJECTION INTRAVENOUS at 13:28

## 2022-03-11 RX ADMIN — HYDROMORPHONE HYDROCHLORIDE 0.5 MG: 2 INJECTION, SOLUTION INTRAMUSCULAR; INTRAVENOUS; SUBCUTANEOUS at 14:31

## 2022-03-11 RX ADMIN — MUPIROCIN 1 APPLICATION: 20 OINTMENT TOPICAL at 20:33

## 2022-03-11 RX ADMIN — PROPOFOL 200 MG: 10 INJECTION, EMULSION INTRAVENOUS at 12:54

## 2022-03-11 RX ADMIN — FENTANYL CITRATE 50 MCG: 0.05 INJECTION, SOLUTION INTRAMUSCULAR; INTRAVENOUS at 11:06

## 2022-03-11 RX ADMIN — ROPIVACAINE HYDROCHLORIDE 20 ML: 5 INJECTION, SOLUTION EPIDURAL; INFILTRATION; PERINEURAL at 11:11

## 2022-03-11 RX ADMIN — LIDOCAINE HYDROCHLORIDE 100 MG: 20 INJECTION, SOLUTION INFILTRATION; PERINEURAL at 12:54

## 2022-03-11 RX ADMIN — HYDROMORPHONE HYDROCHLORIDE 0.5 MG: 1 INJECTION, SOLUTION INTRAMUSCULAR; INTRAVENOUS; SUBCUTANEOUS at 16:37

## 2022-03-11 RX ADMIN — TRANEXAMIC ACID 1000 MG: 1 INJECTION, SOLUTION INTRAVENOUS at 13:08

## 2022-03-11 RX ADMIN — PROPOFOL 40 MG: 10 INJECTION, EMULSION INTRAVENOUS at 14:49

## 2022-03-11 RX ADMIN — DOCUSATE SODIUM 100 MG: 100 CAPSULE, LIQUID FILLED ORAL at 20:33

## 2022-03-11 RX ADMIN — LISINOPRIL 10 MG: 20 TABLET ORAL at 20:33

## 2022-03-11 RX ADMIN — SODIUM CHLORIDE, POTASSIUM CHLORIDE, SODIUM LACTATE AND CALCIUM CHLORIDE 9 ML/HR: 600; 310; 30; 20 INJECTION, SOLUTION INTRAVENOUS at 10:06

## 2022-03-11 NOTE — PLAN OF CARE
Goal Outcome Evaluation:  Plan of Care Reviewed With: patient        Progress: improving       Pt 60 y/o female Aox4, VSS on RA. Up with assist x1 with walker. Voids spontaneously without difficulty. Dsg c/d/I. Regular diet. IV in rt hand with LR at 75. Plan is to d/c home tomorrow. Will continue to monitor.    Kelsey Abernathy BSN, RN   EMS

## 2022-03-11 NOTE — ANESTHESIA PROCEDURE NOTES
Peripheral Block    Pre-sedation assessment completed: 3/11/2022 11:01 AM    Patient reassessed immediately prior to procedure    Patient location during procedure: pre-op  Start time: 3/11/2022 11:01 AM  Stop time: 3/11/2022 11:11 AM  Reason for block: at surgeon's request and post-op pain management  Performed by  Anesthesiologist: Kayode Gaitan MD  Preanesthetic Checklist  Completed: patient identified, IV checked, site marked, risks and benefits discussed, surgical consent, monitors and equipment checked, pre-op evaluation and timeout performed  Prep:  Pt Position: supine  Sterile barriers:cap, gloves, mask and sterile barriers  Prep: ChloraPrep  Patient monitoring: blood pressure monitoring, continuous pulse oximetry and EKG  Procedure    Sedation: yes  Performed under: local infiltration  Guidance:ultrasound guided    ULTRASOUND INTERPRETATION.  Using ultrasound guidance a 22 G gauge needle was placed in close proximity to the femoral nerve, at which point, under ultrasound guidance anesthetic was injected in the area of the nerve and spread of the anesthesia was seen on ultrasound in close proximity thereto.  There were no abnormalities seen on ultrasound; a digital image was taken; and the patient tolerated the procedure with no complications. Images:still images obtained, printed/placed on chart    Laterality:left  Block Type:adductor canal block  Injection Technique:single-shot  Needle Type:echogenic  Needle Gauge:22 G  Resistance on Injection: less than 15 psi    Medications Used: ropivacaine (NAROPIN) 0.5 % injection, 20 mL  Med administered at 3/11/2022 11:11 AM      Post Assessment  Injection Assessment: negative aspiration for heme, no paresthesia on injection and incremental injection  Patient Tolerance:comfortable throughout block  Complications:yes and no

## 2022-03-11 NOTE — ANESTHESIA PREPROCEDURE EVALUATION
Anesthesia Evaluation     Patient summary reviewed and Nursing notes reviewed   NPO Solid Status: > 8 hours             Airway   Mallampati: II  TM distance: >3 FB  Neck ROM: full  no difficulty expected  Dental - normal exam     Pulmonary - normal exam   (+) pneumonia , asthma,home oxygen, sleep apnea,     ROS comment: bronchiectasis  Cardiovascular - normal exam    (+) hypertension, dysrhythmias,       Neuro/Psych  (+) headaches, tremors, psychiatric history Anxiety and Depression,    GI/Hepatic/Renal/Endo    (+) obesity,  GERD, PUD,      Musculoskeletal     (+) back pain,   Abdominal  - normal exam   Substance History - negative use     OB/GYN negative ob/gyn ROS         Other        ROS/Med Hx Other: Scoliosis with chronic LBP and pain pump                  Anesthesia Plan    ASA 4     general with block   Rapid sequence(Severe reflux with frequent aspiration pneumonia)  intravenous induction     Anesthetic plan, all risks, benefits, and alternatives have been provided, discussed and informed consent has been obtained with: patient.    Plan discussed with CRNA.        CODE STATUS:

## 2022-03-11 NOTE — OP NOTE
TOTAL KNEE ARTHROPLASTY OPERATIVE     PATIENT NAME:Renetta Mak     YOB: 1960     ATTENDING PHYSICIAN: Sharad Orourke MD     DATE OF PROCEDURE: 3/11/2022     PREOPERATIVE DIAGNOSIS: Severe degenerative osteoarthritis, left knee.    POSTOPERATIVE DIAGNOSIS: Post-Op Diagnosis Codes:     * Left knee pain, unspecified chronicity [M25.562]    PRINCIPAL DIAGNOSIS: Severe degenerative osteoarthritis left knee.    PROCEDURE: Left total knee arthroplasty.    SURGEON:  Sharad Orourke M.D.    ASSISTANT: Mary Lou Alcocer first assistant was responsible for performing the following activities: Retraction, Suction, Irrigation, Suturing, Closing and Placing Dressing and their skilled assistance was necessary for the success of this case.       ANESTHESIOLOGIST: Dr. Bg Gaitan MD    ANESTHESIA: Adductor canal block for postop pain control followed by general anesthesia.    POSITION: Supine on the operating table.    DRAINS: None.    COMPLICATIONS: None.    ESTIMATED BLOOD LOSS: 200ml    IMPLANT USED: [employer] total knee replacement system, #10 narrow posterior cruciate retaining femoral component, number F tibia with a 30 mm intramedullary stem, 10 mm thick MC, medially constrained Vitamin E impregnated polyethylene insert, number 35 patella anchored into position using Refobicin antibiotic impregnated bone cement.     SPECIMENS: * No orders in the log *        INDICATION: The patient has been having very significant pain and discomfort in the knee.  We had scheduled the patient for total knee replacement after all forms of conservative nonoperative care had failed to provide adequate relief of symptoms.  Patient understood all the risks and benefits which were discussed in great detail including the possibility of death, infection, myocardial infarction, DVT, pulmonary embolism, stiffness of the knee, wound infection and breakdown, possibility of revision surgery, neurovascular compromise, pneumonia, pulmonary  embolism      DETAILS OF PROCEDURE: Surgical timeout was called. Operative extremity was correctly identified in the operating room suite. Patient was placed under appropriate anesthetic.  Patient was administered IV antibiotics per SCIP protocol and hospital policy. The patient’s operative extremity was correctly identified in the operating room suite.A Tourniquet was applied after the leg has been exsanguinated. The correctly identified extremity was prepped and draped in a standard fashion.      An anterior approach was performed and a quad sparing, subvastus approach was carried out. The patellofemoral ligament was resected. Medial soft tissue release was carried out on the medial face of the tibia to balance the soft tissues and to release the contracture of the knee MCL. Tibial Osteophytes were resected. Severe bone-on-bone appearance was noted.  A thorough synovectomy was carried out. The Synovium was thickened and hypertrophic. The PCL and MCL were carefully protected throughout the entire procedure. The End [employer]ract [employer] Navigation System was brought into the operating room and the distal femur was mapped. A 10 mm thick cut was made off the distal femur. A measuring guide was used and #10 narrow posterior cruciate retaining femoral component jig was found to be the best fit. Anterior, posterior and chamfer cuts were created. Care was taken to prevent creating a distal femoral notch. The proximal tibia was then mapped with navigation. 4 mm of the bone was resected off the medial tibial plateau.  An appropriate tibial slope was built into the cut to match the normal anatomy.  A trial reduction was carried out. Rotation and orientation of the components were carefully marked.  The number F tibia was found to be resting nicely on the cortical margins of the proximally resected metaphyseal bone of the proximal tibia.  Flexion and extension gaps were checked and found to be symmetric. The stability of the  components was checked in full extension, mid- flexion and full flexion.The patella was everted, it was measured and cut. Patellar Tracking was excellent.  A #35 patella was found to be the best fit sitting nicely on the resected patellar bone margins.  A Lateral release was not deemed necessary. Femoral lug holes were drilled. The Proximal Tibia was die punched. Patellar anchor holes were drilled.  The posterior capsular structures and the subperiosteal ligamentous insertions were infiltrated with Exparel as a local anesthetic.    The cancellous bone was lavaged with a Pulse lavage  and dried.  We also used diluted Betadine as an antiseptic rinse solution.  Cement was mixed on the back table. Components were cemented into position sequentially, starting with the number F tibial component and going to the #10 narrow posterior cruciate retaining femur and then the 35 patella. The excess amounts of bone cement were removed from the bone-metal interface. Trial reduction was carried out once the cement was fully cured. A 10 mm tibial polyethylene insert, MC medially constrained design insert, was then locked into position on the tibial tray. Wound was lavaged with antibiotic containing irrigating solution. Tourniquet was deflated, hemostasis achieved. An analgesic cocktail was injected intra-articularly into the joint capsule and ligamentous insertions on bone for post op pain relief. The arthrotomy was repaired in 60 degrees of flexion. Subcutaneous sutures were applied. Occlusive dressing was applied. No complications were encountered. Sponge count and needle count were correct. The patient was reversed from anesthesia and taken from the operating room to the recovery room in stable condition. I discussed the satisfactory performance of this procedure with the patient’s family and answered all questions for them.       Sharad Orourke MD  3/11/2022  12:47 EST

## 2022-03-11 NOTE — ANESTHESIA PROCEDURE NOTES
Airway  Urgency: elective    Date/Time: 3/11/2022 12:55 PM  Airway not difficult    General Information and Staff    Patient location during procedure: OR  Anesthesiologist: Shmuel Chowdhury MD  CRNA: Samina Cronin CRNA    Indications and Patient Condition  Indications for airway management: airway protection    Preoxygenated: yes  Mask difficulty assessment: 0 - not attempted    Final Airway Details  Final airway type: endotracheal airway      Successful airway: ETT    Successful intubation technique: RSI and direct laryngoscopy  Facilitating devices/methods: intubating stylet and cricoid pressure  Endotracheal tube insertion site: oral  Blade: Isacc  Blade size: 3  ETT size (mm): 7.0  Cormack-Lehane Classification: grade I - full view of glottis  Placement verified by: chest auscultation and capnometry   Measured from: lips  ETT/EBT  to lips (cm): 21  Number of attempts at approach: 1  Assessment: lips, teeth, and gum same as pre-op and atraumatic intubation    Additional Comments  RSI used d/t severe reflux and history off aspiration pneumonia

## 2022-03-11 NOTE — H&P
History & Physical       Patient: Renetta Mak    Date of Admission: 3/11/2022  8:52 AM    YOB: 1960    Medical Record Number: [id number]    Attending Physician: Sharad Orourke MD        Chief Complaints: Left knee pain, unspecified chronicity [M25.562]      History of Present Illness: 61 y.o. female presents with Left knee pain, unspecified chronicity [M25.562]. Onset of symptoms was gradual and slowly progressive over the past 2 to 3 years.  Symptoms are associated with pain on the medial aspect of the left knee associated with a limp.  Symptoms are aggravated by deep flexion of the knee and going up and down the steps.   Symptoms improve with Using a supportive brace and anti-inflammatory medication use. Patient is now being admitted to the services of Sharad Orourke MD for further evaluation and treatment.        Allergies   Allergen Reactions   • Sulfacetamide Hives, Itching and Rash   • Vancomycin Itching and Hives     Vancomycin   • Duloxetine Rash   • Metoprolol Other (See Comments) and Unknown - High Severity     Decrease heart rate  weakness    Decrease heart rate   • Sulfa Antibiotics Rash         Home Medications:  Medications Prior to Admission   Medication Sig Dispense Refill Last Dose   • acetaminophen-codeine (TYLENOL with CODEINE #2) 300-15 MG tablet    3/10/2022 at 2200   • albuterol sulfate  (90 Base) MCG/ACT inhaler Inhale 2 puffs Every 4 (Four) Hours As Needed.   3/11/2022 at 0630   • cetirizine (zyrTEC) 10 MG tablet Take 10 mg by mouth Daily As Needed for Allergies.   3/10/2022 at 0800   • Cyanocobalamin (VITAMIN B-12 PO) HOLD PRIOR TO SURG   Past Week at Unknown time   • desvenlafaxine (PRISTIQ) 100 MG 24 hr tablet Take 100 mg by mouth Every Evening.   3/10/2022 at 0800   • flecainide (TAMBOCOR) 50 MG tablet Take 50 mg by mouth 2 (Two) Times a Day.   3/11/2022 at 0630   • fluticasone (FLONASE) 50 MCG/ACT nasal spray 2 sprays into the nostril(s) as directed by provider  Daily.   3/11/2022 at 0700   • gabapentin (NEURONTIN) 100 MG capsule Take 200 mg by mouth 2 (Two) Times a Day.   3/11/2022 at 0630   • lisinopril (PRINIVIL,ZESTRIL) 10 MG tablet Take 10 mg by mouth Daily.   3/10/2022 at 0800   • meloxicam (MOBIC) 7.5 MG tablet 1 PO BID (Patient taking differently: Take 15 mg by mouth Daily. HOLDING FOR SURGERY) 60 tablet 1 Past Week at Unknown time   • montelukast (SINGULAIR) 10 MG tablet Take 10 mg by mouth Every Night.   3/10/2022 at Unknown time   • Morphine Sulfate 0.5 MG/ML solution Infuse  into a venous catheter. PER PAIN PUMP 0.77 MG PER DAY   3/11/2022 at Unknown time   • Multiple Vitamins-Minerals (MULTIVITAMIN ADULT EXTRA C PO) HOLD PRIOR TO SURG   Past Week at Unknown time   • mupirocin (BACTROBAN) 2 % nasal ointment into the nostril(s) as directed by provider. AS DIRECTED PREOP   3/11/2022 at 0700   • omeprazole (priLOSEC) 40 MG capsule Take 40 mg by mouth Daily.   3/11/2022 at 0630   • primidone (MYSOLINE) 50 MG tablet Take 50 mg by mouth 2 (Two) Times a Day.   3/11/2022 at 0630   • tiZANidine (ZANAFLEX) 4 MG tablet Take 8 mg by mouth Every Night.   3/10/2022 at 2200   • vitamin D3 125 MCG (5000 UT) capsule capsule Take  by mouth.   Past Week at Unknown time   • budesonide (PULMICORT) 0.5 MG/2ML nebulizer solution Inhale 0.5 mg Daily.   More than a month at Unknown time   • Narcan 4 MG/0.1ML nasal spray     at pt has no used       Current Medications:  Scheduled Meds:bupivacaine liposome, 20 mL, Injection, Once  ceFAZolin, 2 g, Intravenous, Once  meloxicam, 15 mg, Oral, Once  sodium chloride, 3 mL, Intravenous, Q12H      Continuous Infusions:lactated ringers, 9 mL/hr, Last Rate: 9 mL/hr (03/11/22 1006)      PRN Meds:.fentanyl  •  fentaNYL citrate (PF)  •  lidocaine PF 1%  •  midazolam  •  sodium chloride       Past Medical History:   Diagnosis Date   • Anemia    • Anxiety and depression    • Arthritis    • Asthma    • Back pain    • Bronchiectasis (HCC)    • Chronic  knee pain after total replacement of right knee joint    • Chronic pain     BACK   • GERD (gastroesophageal reflux disease)    • Hearing loss    • History of bronchitis     JUST FINISHED ANTIBIOTIC 3/9/2022 PT STATES DR RICH IS AWARE   • History of COVID-19     2/2021 HOSPITAL FOR 3 DAYS   • History of stomach ulcers    • IBS (irritable bowel syndrome)    • Irregular heart beat     HAS SEEN CARDIOLOGY BEFORE AND THEY WERE NOT CONCERNED   • Joint pain    • Left knee pain    • Neuropathy    • On home oxygen therapy     PRN   • Palpitations    • Pneumonia     FREQUENTLY   • Restrictive airway disease    • Scoliosis    • Sleep apnea     CPAP   • Tremors of nervous system    • Urine incontinence         Past Surgical History:   Procedure Laterality Date   • ANTERIOR CERVICAL DISCECTOMY W/ FUSION     • CHOLECYSTECTOMY     • COLONOSCOPY     • HYSTERECTOMY     • KNEE SURGERY Right     right knee partial   • KNEE SURGERY Right     full knee replacement   • LUMBAR SPINE SURGERY      AS A CHILD FOR SCOLIOSIS   • PAIN PUMP INSERTION/REVISION          Social History     Occupational History   • Not on file   Tobacco Use   • Smoking status: Never Smoker   • Smokeless tobacco: Never Used   Vaping Use   • Vaping Use: Never used   Substance and Sexual Activity   • Alcohol use: Yes     Comment: RARELY   • Drug use: Never   • Sexual activity: Not on file      Social History     Social History Narrative   • Not on file        Family History   Problem Relation Age of Onset   • Malig Hyperthermia Neg Hx          Review of Systems:   HEENT: Patient denies any headaches, vision changes, change in hearing, or tinnitus, Patient denies any rhinorrhea,epistaxis, sinus pain, mouth or dental problems, sore throat or hoarseness, or dysphagia  Pulmonary: Patient denies any cough, congestion, SOA, or wheezing  Cardiovascular: Patient denies any chest pain, dyspnea, palpitations, weakness, intolerance of exercise, varicosities, swelling of  "extremities, known murmur  Gastrointestinal:  Patient denies nausea, vomiting, diarrhea, constipation, loss  of appetite, change in appetite, dysphagia, gas, heartburn, melena, change in bowel habits, use of laxatives or other drugs to alter the function of the gastrointestinal tract.  Genital/Urinary: Patient denies dysuria, change in color of urine, change in frequency of urination, pain with urgency, incontinence, retention, or nocturia.  Musculoskeletal: Patient denies increased warmth; redness; or swelling of joints; limitation of function; deformity; crepitation: pain in a joint or an extremity, the neck, or the back, especially with movement.  Neurological: Patient denies dizziness, tremor, ataxia, difficulty in speaking, change in speech, paresthesia, loss of sensation, seizures, syncope, changes in memory.  Endocrine system: Patient denies tremors, palpitations, intolerance of heat or cold, polyuria, polydipsia, polyphagia, diaphoresis, exophthalmos, or goiter.  Psychological: Patient denies thoughts/plans or harming self or other; depression,  insomnia, night terrors, dara, memory loss, disorientation.  Skin: Patient denies any bruising, rashes, discoloration, pruritus, wounds, ulcers, decubiti, changes in the hair or nails  Hematopoietic: Patient denies history of spontaneous or excessive bleeding, epistaxis, hematuria, melena, fatigue, enlarged or tender lymph nodes, pallor, history of anemia.    Physical Exam: 61 y.o. female  Vitals:    03/11/22 0935 03/11/22 1105 03/11/22 1113   BP: 140/81 126/83 127/68   BP Location: Right arm Left arm Left arm   Patient Position: Lying Lying Lying   Pulse: 69  67   Resp: 18  16   Temp: 97.4 °F (36.3 °C)     TempSrc: Oral     SpO2: 98% 97% 100%   Weight: 108 kg (238 lb 15.7 oz)     Height: 167.6 cm (66\")         General Appearance:          Alert, cooperative, in no acute distress                                                 Head:    Normocephalic, without obvious " abnormality, atraumatic   Eyes:            Lids and lashes normal, conjunctivae and sclerae normal, no   icterus, no pallor, corneas clear, PERRLA   Ears:    Ears appear intact with no abnormalities noted   Throat:   No oral lesions, no thrush, oral mucosa moist   Neck:   No adenopathy, supple, trachea midline, no thyromegaly, no   carotid bruit, no JVD   Back:     No kyphosis present, no scoliosis present, no skin lesions,      erythema or scars, no tenderness to percussion or                   palpation,   range of motion normal   Lungs:     Clear to auscultation,respirations regular, even and                  unlabored    Heart:    Regular rhythm and normal rate, normal S1 and S2, no            murmur, no gallop, no rub, no click   Chest Wall:    No abnormalities observed   Abdomen:     Normal bowel sounds, no masses, no organomegaly, soft        nontender, nondistended, no guarding, no rebound                tenderness   Rectal:     Deferred   Extremities:   Tenderness over medial aspect of the left knee. Moves all extremities well, no edema,   no cyanosis, no redness   Pulses:   Pulses palpable and equal bilaterally   Skin:   No bleeding, bruising or rash   Lymph nodes:   No palpable adenopathy   Neurologic:   Cranial nerves 2 - 12 grossly intact, sensation intact, DTR       present and equal bilaterally      Left knee. Patient has crepitus throughout range of motion. Positive patellar grind test. Mild effusion. Lachman is negative. Pivot shift is negative. Anterior and posterior drawer signs are negative. Significant joint line tenderness is noted on the medial aspect of the knee. Patient has a varus orientation of the knee. There is fullness and tenderness in the popliteal fossa. Mild distention of a popliteal cyst is noted in this location. Range of motion in flexion is from 0- 100 degrees. Neurovascular status is intact.  Dorsalis pedis and posterior tibial artery pulses are palpable. Common peroneal nerve  function is well preserved. Patient's gait is cautious and antalgic. Skin and soft tissues are mildly swollen, consistent with synovitis and effusion. The patient has a significant limp with the first few steps after starting the gait cycle. Getting out of a chair takes a lot of effort due to pain on knee flexion.     Diagnostic Tests:  Pre-Admission Testing on 03/10/2022   Component Date Value Ref Range Status   • Glucose 03/10/2022 52 (A) 65 - 99 mg/dL Final   • BUN 03/10/2022 16  8 - 23 mg/dL Final   • Creatinine 03/10/2022 1.02 (A) 0.57 - 1.00 mg/dL Final   • Sodium 03/10/2022 140  136 - 145 mmol/L Final   • Potassium 03/10/2022 3.9  3.5 - 5.2 mmol/L Final   • Chloride 03/10/2022 105  98 - 107 mmol/L Final   • CO2 03/10/2022 25.1  22.0 - 29.0 mmol/L Final   • Calcium 03/10/2022 9.0  8.6 - 10.5 mg/dL Final   • BUN/Creatinine Ratio 03/10/2022 15.7  7.0 - 25.0 Final   • Anion Gap 03/10/2022 9.9  5.0 - 15.0 mmol/L Final   • eGFR 03/10/2022 61.9  >60.0 mL/min/1.73 Final    National Kidney Foundation and American Society of Nephrology (ASN) Task Force recommended calculation based on the Chronic Kidney Disease Epidemiology Collaboration (CKD-EPI) equation refit without adjustment for race.   • WBC 03/10/2022 7.51  3.40 - 10.80 10*3/mm3 Final   • RBC 03/10/2022 4.05  3.77 - 5.28 10*6/mm3 Final   • Hemoglobin 03/10/2022 12.3  12.0 - 15.9 g/dL Final   • Hematocrit 03/10/2022 37.9  34.0 - 46.6 % Final   • MCV 03/10/2022 93.6  79.0 - 97.0 fL Final   • MCH 03/10/2022 30.4  26.6 - 33.0 pg Final   • MCHC 03/10/2022 32.5  31.5 - 35.7 g/dL Final   • RDW 03/10/2022 12.2 (A) 12.3 - 15.4 % Final   • RDW-SD 03/10/2022 42.4  37.0 - 54.0 fl Final   • MPV 03/10/2022 9.2  6.0 - 12.0 fL Final   • Platelets 03/10/2022 367  140 - 450 10*3/mm3 Final   • QT Interval 03/10/2022 388  ms Final   • COVID19 03/10/2022 Not Detected  Not Detected - Ref. Range Final     No results found.      Assessment:  Patient Active Problem List   Diagnosis   •  Left knee pain   • Anxiety   • Anemia   • Allergic rhinitis   • Age-related nuclear cataract of both eyes   • Skin tag   • Actinic keratosis   • Seborrheic keratoses   • Achilles tendinitis   • Abnormal mammogram   • Aspiration pneumonitis (HCC)   • Aphasia of unknown origin   • Asthma   • Exudative bronchiolitis   • Mild sleep apnea   • Nocturnal hypoxemia   • Obstructive sleep apnea syndrome   • Atrioventricular block, first degree   • Bacterial pneumonia   • Dysphonia   • Dysphagia   • Dyspareunia   • Degeneration of lumbosacral intervertebral disc with myelopathy   • Degeneration of intervertebral disc of cervical region   • Deficiency of other specified B group vitamins   • COVID-19   • Chronic bronchitis (HCC)   • Chronic back pain   • Cervical pain (neck)   • Cervical disc herniation   • Breast pain   • Bone spur   • Cognitive dysfunction   • Bilateral sensorineural hearing loss   • Bilateral presbyopia   • Bilateral myopia   • Balance disorder   • Hand muscle weakness   • Generalized muscle weakness   • Gastro-esophageal reflux disease without esophagitis   • Feeling of incomplete bladder emptying   • Eyelid myokymia   • Excessive thirst   • Essential tremor   • Essential (primary) hypertension   • Encounter for removal of sutures   • Elevated parathyroid hormone   • Elevated alkaline phosphatase level   • Iron deficiency anemia   • Intermittent urinary stream   • Insufficiency of tear film of both eyes   • Insomnia   • Idiopathic scoliosis   • Hypoglycemia   • Heel pain   • Hearing loss, left   • Wheezing   • Voiding difficulty   • Vocal tremor   • Vaginal dryness   • Urination pain   • Urgency incontinence   • Unstable knee   • Tremor   • Tinnitus   • Status post bariatric surgery   • Spondylosis of lumbosacral region without myelopathy or radiculopathy   • Sciatica   • Sacroiliac joint pain   • Right maxillary sinusitis   • Recurrent pneumonia   • Recurrent cold sores   • Polyneuropathy   • Pigmented skin  lesions   • Palpitations   • Osteoporosis   • Osteoarthritis   • Nonrheumatic mitral valve insufficiency   • Nocturia   • Neuropathy, hereditary sensory   • Orthostatic hypotension   • Obesity   • Neuropathy   • Neuropathic pain   • Myofascial pain syndrome   • Migraine without status migrainosus, not intractable   • Menopausal state   • Melena   • MDD (major depressive disorder)   • Knee stiff   • Irritable bowel syndrome without diarrhea   • Left knee pain, unspecified chronicity         Plan:  The patient voiced understanding of the risks, benefits, and alternative forms of treatment that were discussed and the patient consents to proceed with left total knee arthroplasty.   The patient was seen today for preoperative discussion.  The patient has been tried on over-the-counter and prescription NSAID's despite the risks of anti-inflammatory bleeding, peptic ulcers and erosive gastritis with short term benefit only.  Braces have been prescribed for mechanical support.  Patient has been participating in an exercise program specifically targeting joint pain relief with limited benefit. Intraarticular injections have been used periodically with some but not complete relief of pain.  Ambulation aids have also been utilized.      The details of the surgical procedure were explained including the location of probable incisions and a description of the likely hardware/grafts to be used. The patient understands the likely convalescence after surgery as well as the rehabilitation required.  Also, we have thoroughly discussed with the patient the risks, benefits and alternatives to surgery.  Risks include but are not limited to the risk of infection, joint stiffness, limited range of motion, wound healing problems, scar tissue build up, myocardial infarction, stroke, blood clots (including DVT and/or pulmonary embolus along with the risk of death) neurologic and/or vascular injury, limb length discrepancy, fracture,  dislocation, nonunion, malunion, continued pain and need for further surgery including hardware failure requiring revision.     Discharge Plan: tomorrow to home and home health      Date: 3/11/2022    Sharad Orourke MD      DICTATED UTILIZING DRAGON DICTATION

## 2022-03-12 VITALS
TEMPERATURE: 96.7 F | SYSTOLIC BLOOD PRESSURE: 102 MMHG | WEIGHT: 238.98 LBS | OXYGEN SATURATION: 95 % | RESPIRATION RATE: 12 BRPM | BODY MASS INDEX: 38.41 KG/M2 | HEART RATE: 76 BPM | DIASTOLIC BLOOD PRESSURE: 56 MMHG | HEIGHT: 66 IN

## 2022-03-12 LAB
ANION GAP SERPL CALCULATED.3IONS-SCNC: 7 MMOL/L (ref 5–15)
BUN SERPL-MCNC: 17 MG/DL (ref 8–23)
BUN/CREAT SERPL: 14.8 (ref 7–25)
CALCIUM SPEC-SCNC: 8 MG/DL (ref 8.6–10.5)
CHLORIDE SERPL-SCNC: 104 MMOL/L (ref 98–107)
CO2 SERPL-SCNC: 26 MMOL/L (ref 22–29)
CREAT SERPL-MCNC: 1.15 MG/DL (ref 0.57–1)
EGFRCR SERPLBLD CKD-EPI 2021: 53.6 ML/MIN/1.73
GLUCOSE SERPL-MCNC: 96 MG/DL (ref 65–99)
HCT VFR BLD AUTO: 31.1 % (ref 34–46.6)
HGB BLD-MCNC: 10.1 G/DL (ref 12–15.9)
POTASSIUM SERPL-SCNC: 4.8 MMOL/L (ref 3.5–5.2)
SODIUM SERPL-SCNC: 137 MMOL/L (ref 136–145)

## 2022-03-12 PROCEDURE — 25010000002 CEFAZOLIN IN DEXTROSE 2-4 GM/100ML-% SOLUTION: Performed by: ORTHOPAEDIC SURGERY

## 2022-03-12 PROCEDURE — 85014 HEMATOCRIT: CPT | Performed by: ORTHOPAEDIC SURGERY

## 2022-03-12 PROCEDURE — 99024 POSTOP FOLLOW-UP VISIT: CPT | Performed by: ORTHOPAEDIC SURGERY

## 2022-03-12 PROCEDURE — G0378 HOSPITAL OBSERVATION PER HR: HCPCS

## 2022-03-12 PROCEDURE — 97116 GAIT TRAINING THERAPY: CPT | Performed by: PHYSICAL THERAPIST

## 2022-03-12 PROCEDURE — 97161 PT EVAL LOW COMPLEX 20 MIN: CPT | Performed by: PHYSICAL THERAPIST

## 2022-03-12 PROCEDURE — 80048 BASIC METABOLIC PNL TOTAL CA: CPT | Performed by: ORTHOPAEDIC SURGERY

## 2022-03-12 PROCEDURE — 85018 HEMOGLOBIN: CPT | Performed by: ORTHOPAEDIC SURGERY

## 2022-03-12 RX ORDER — SODIUM CHLORIDE 9 MG/ML
100 INJECTION, SOLUTION INTRAVENOUS CONTINUOUS
Status: DISCONTINUED | OUTPATIENT
Start: 2022-03-12 | End: 2022-03-12 | Stop reason: HOSPADM

## 2022-03-12 RX ORDER — PSEUDOEPHEDRINE HCL 30 MG
100 TABLET ORAL 2 TIMES DAILY
Qty: 60 CAPSULE | Refills: 0 | Status: SHIPPED | OUTPATIENT
Start: 2022-03-12 | End: 2022-05-26

## 2022-03-12 RX ORDER — ALUMINA, MAGNESIA, AND SIMETHICONE 2400; 2400; 240 MG/30ML; MG/30ML; MG/30ML
15 SUSPENSION ORAL EVERY 8 HOURS PRN
Status: DISCONTINUED | OUTPATIENT
Start: 2022-03-12 | End: 2022-03-12 | Stop reason: HOSPADM

## 2022-03-12 RX ADMIN — OXYCODONE AND ACETAMINOPHEN 2 TABLET: 5; 325 TABLET ORAL at 08:59

## 2022-03-12 RX ADMIN — PRIMIDONE 50 MG: 50 TABLET ORAL at 08:59

## 2022-03-12 RX ADMIN — OXYCODONE AND ACETAMINOPHEN 2 TABLET: 5; 325 TABLET ORAL at 04:42

## 2022-03-12 RX ADMIN — POLYETHYLENE GLYCOL 3350 17 G: 17 POWDER, FOR SOLUTION ORAL at 08:59

## 2022-03-12 RX ADMIN — SODIUM CHLORIDE 100 ML/HR: 9 INJECTION, SOLUTION INTRAVENOUS at 01:20

## 2022-03-12 RX ADMIN — MUPIROCIN 1 APPLICATION: 20 OINTMENT TOPICAL at 09:00

## 2022-03-12 RX ADMIN — GABAPENTIN 200 MG: 100 CAPSULE ORAL at 08:59

## 2022-03-12 RX ADMIN — FLECAINIDE ACETATE TABLET 50 MG: 50 TABLET ORAL at 08:59

## 2022-03-12 RX ADMIN — MAGNESIUM HYDROXIDE,ALUMINUM HYDROXICE,SIMETHICONE 15 ML: 240; 2400; 2400 SUSPENSION ORAL at 04:42

## 2022-03-12 RX ADMIN — PANTOPRAZOLE SODIUM 40 MG: 40 TABLET, DELAYED RELEASE ORAL at 06:42

## 2022-03-12 RX ADMIN — DOCUSATE SODIUM 100 MG: 100 CAPSULE, LIQUID FILLED ORAL at 08:59

## 2022-03-12 RX ADMIN — RIVAROXABAN 10 MG: 10 TABLET, FILM COATED ORAL at 08:59

## 2022-03-12 RX ADMIN — OXYCODONE AND ACETAMINOPHEN 2 TABLET: 5; 325 TABLET ORAL at 13:31

## 2022-03-12 RX ADMIN — CEFAZOLIN SODIUM 2 G: 2 INJECTION, SOLUTION INTRAVENOUS at 04:42

## 2022-03-12 NOTE — PLAN OF CARE
The pt remain stable. A/O. No s/s distress. Spouse at bedside. S/p L TKA on 03/11/22. WBAT. PRN Percocet for pain control. Pt c/o acid reflux, on-call for [facility] notified. Dr. Parkinson at bedside also notified w/ given order for PRN Mylanta 15ml every 8 hrs. BP runs low 80/53-88/57 this evening. Slight dizziness. Per Dr. Parkinson, if BP still low, start NS 100ml/hr. Voiding per bed side commode. Last BM was on 03/10/22. Will continue to monitor.

## 2022-03-12 NOTE — PROGRESS NOTES
Discharge Planning Assessment  [facility]     Patient Name: Renetta Mak  MRN: [id number]  Today's Date: 3/12/2022    Admit Date: 3/11/2022     Discharge Needs Assessment    No documentation.                Discharge Plan     Row Name 03/12/22 0923       Plan    Plan Home with [facility]    Plan Comments Call placed to pt to discuss DCP/needs.  Pt confirmed that she does reside  at [facility] and will return home at [place].  Pt requested to use [facility] for therapy at [place].  Referral placed in [facility] and called to Galina with [facility] who stated they will reach out to pt to arrange PT follow up at home.              Continued Care and Services - Admitted Since 3/11/2022     Home Medical Care     Service Provider Request Status Selected Services Address Phone Fax Patient Preferred    [facility]  Accepted N/A [address] [facility] [id number] [phone] --              Expected Discharge Date and Time     Expected Discharge Date Expected Discharge Time    Mar 12, 2022          Demographic Summary    No documentation.                Functional Status    No documentation.                Psychosocial    No documentation.                Abuse/Neglect    No documentation.                Legal    No documentation.                Substance Abuse    No documentation.                Patient Forms    No documentation.                   Marybeth Moscoso MSW

## 2022-03-12 NOTE — PLAN OF CARE
Goal Outcome Evaluation:PT POD1 Left total knee, VSS, afebrile, pain controlled with po pain medication, worked with therapy today, voiding well, educated on monitoring BP prior to taking lisinopril, educated on Xarelto for prevention of blood clots, DC instruction reviewed with pt, all questions answered, pt taken via WC with all belongings to DC exit.

## 2022-03-12 NOTE — PROGRESS NOTES
Orthopedic Progress Note      Patient: Renetta Mak  Date of Admission: 3/11/2022  YOB: 1960  Medical Record Number: [id number]    POD # :  1 Day Post-Op Procedure(s) (LRB):  TOTAL KNEE ARTHROPLASTY (Left)    Systemic or Specific Complaints: No Complaints    Pain Relief: some relief    Physical Exam:  61 y.o.  female  Vitals:  Temp:  [96.8 °F (36 °C)-98.3 °F (36.8 °C)] 96.8 °F (36 °C)  Heart Rate:  [50-70] 67  Resp:  [14-16] 16  BP: ()/(53-83) 99/64  alert and oriented  Chest: Clear to auscultation  CV: Regular Rate and Rhythm  Abd: Soft, NT, with BS +  Ext: NV intact. ROM appropriate. Calf is soft and nontender. Negative Homans sign  Skin: Incision clean dry and intact w/out signs or  symptoms of infection.    Activity: Mobilizing Per P.T.   Weight Bearing: As Tolerated    Data Review     Admission on 03/11/2022   Component Date Value Ref Range Status    Glucose 03/12/2022 96  65 - 99 mg/dL Final    BUN 03/12/2022 17  8 - 23 mg/dL Final    Creatinine 03/12/2022 1.15 (A) 0.57 - 1.00 mg/dL Final    Sodium 03/12/2022 137  136 - 145 mmol/L Final    Potassium 03/12/2022 4.8  3.5 - 5.2 mmol/L Final    Chloride 03/12/2022 104  98 - 107 mmol/L Final    CO2 03/12/2022 26.0  22.0 - 29.0 mmol/L Final    Calcium 03/12/2022 8.0 (A) 8.6 - 10.5 mg/dL Final    BUN/Creatinine Ratio 03/12/2022 14.8  7.0 - 25.0 Final    Anion Gap 03/12/2022 7.0  5.0 - 15.0 mmol/L Final    eGFR 03/12/2022 53.6 (A) >60.0 mL/min/1.73 Final    National Kidney Foundation and American Society of Nephrology (ASN) Task Force recommended calculation based on the Chronic Kidney Disease Epidemiology Collaboration (CKD-EPI) equation refit without adjustment for race.    Hemoglobin 03/12/2022 10.1 (A) 12.0 - 15.9 g/dL Final    Hematocrit 03/12/2022 31.1 (A) 34.0 - 46.6 % Final       No results found.    Medications:  budesonide, 0.5 mg, Nebulization, Daily  desvenlafaxine, 100 mg, Oral, Q PM  docusate sodium, 100 mg, Oral,  BID  flecainide, 50 mg, Oral, BID  fluticasone, 2 spray, Nasal, Daily  gabapentin, 200 mg, Oral, BID  lisinopril, 10 mg, Oral, Nightly  montelukast, 10 mg, Oral, Nightly  mupirocin, , Each Nare, BID  NON FORMULARY, 0.77 mg, Intrathecal Infusion, Daily  pantoprazole, 40 mg, Oral, QAM  polyethylene glycol, 17 g, Oral, Daily  primidone, 50 mg, Oral, BID  rivaroxaban, 10 mg, Oral, Daily  tiZANidine, 8 mg, Oral, Nightly        acetaminophen    albuterol    aluminum-magnesium hydroxide-simethicone    bisacodyl    bisacodyl    cetirizine    dextrose    dextrose    glucagon (human recombinant)    magnesium hydroxide    ondansetron **OR** ondansetron    oxyCODONE-acetaminophen    oxyCODONE-acetaminophen    Assessment:  Doing well POD  # 1 Day Post-Op Procedure(s) (LRB):  TOTAL KNEE ARTHROPLASTY (Left)  Problems Addressed this Visit          Musculoskeletal and Injuries    * (Principal) Left knee pain    Relevant Medications    ceFAZolin in dextrose (ANCEF) IVPB solution 2 g (Completed)    bupivacaine liposome (EXPAREL) 1.3 % injection 266 mg (Completed)    Other Relevant Orders    Ambulatory Referral to Home Health (Hospital)    [facility]          Diagnoses         Codes Comments    Left knee pain, unspecified chronicity     ICD-10-CM: M25.562  ICD-9-CM: 719.46             Plan:  Continue efforts to mobilize  Continue Pain Control Measures  Continue incisional Care  DVT prophylaxis    Discharge Plan:Home today    DENISE Oliveira MD  Date: 3/12/2022  Time: 10:09 EST

## 2022-03-12 NOTE — PLAN OF CARE
Problem: Adult Inpatient Plan of Care  Goal: Plan of Care Review  Outcome: Ongoing, Progressing  Flowsheets  Taken 3/12/2022 1213  Progress: improving  Taken 3/12/2022 1208  Progress: improving  Plan of Care Reviewed With:   patient   spouse   Goal Outcome Evaluation:  Plan of Care Reviewed With: patient, spouse        Progress: improving  Pt is a 61 y.o. female admitted to [facility] with c/o L knee pain and received L TKA on 3/11/2022. Pt presents today with increases in L knee pain and usual post operative limitations, and decreased functional mobility. Pt required SBA to CGA for bed mobility, CGA with FWW for STS transfers, and CGA, Min assist for ambulation with approx 150 ft with increased truck flexion, decreased L LE heel strike, decreased weight shift though no LOB. Pt will benefit from skilled PT to address the previous deficits and improve overall safety with functional mobility. Pt with good tolerance to ambulation though increased antalgia with prolonged weight bearing. Pt reviewed HEP program. No DME needs. Anticipate pt will D/C to home with home health..

## 2022-03-12 NOTE — PROGRESS NOTES
Name: Renetta Mak ADMIT: 3/11/2022   : 1960  PCP: Ele Laureano MD    MRN: [id number] LOS: 0 days   AGE/SEX: 61 y.o. female  ROOM: [facility]/     Subjective   Subjective   no new issues overnight.  No lightheadedness or dizziness.  Tolerating diet.  No chest pain or palpitations.     Objective   Objective   Vital Signs  Temp:  [96.7 °F (35.9 °C)-98.3 °F (36.8 °C)] 96.7 °F (35.9 °C)  Heart Rate:  [50-76] 76  Resp:  [12-16] 12  BP: ()/(53-80) 102/56  SpO2:  [91 %-100 %] 95 %  on  Flow (L/min):  [2-4] 2;   Device (Oxygen Therapy): room air  Body mass index is 38.57 kg/m².  Physical Exam  Constitutional:       General: She is not in acute distress.     Appearance: She is not diaphoretic.   Cardiovascular:      Rate and Rhythm: Normal rate and regular rhythm.      Heart sounds: Normal heart sounds.   Pulmonary:      Effort: Pulmonary effort is normal.      Breath sounds: Normal breath sounds.   Abdominal:      General: Bowel sounds are normal.      Palpations: Abdomen is soft.      Tenderness: There is no abdominal tenderness.   Musculoskeletal:         General: No swelling.   Skin:     General: Skin is warm and dry.   Neurological:      Mental Status: She is alert and oriented to person, place, and time.         Results Review:       I reviewed the patient's new clinical results.  Results from last 7 days   Lab Units 22  0610 03/10/22  0716   WBC 10*3/mm3  --  7.51   HEMOGLOBIN g/dL 10.1* 12.3   PLATELETS 10*3/mm3  --  367     Results from last 7 days   Lab Units 22  0610 03/10/22  0716   SODIUM mmol/L 137 140   POTASSIUM mmol/L 4.8 3.9   CHLORIDE mmol/L 104 105   CO2 mmol/L 26.0 25.1   BUN mg/dL 17 16   CREATININE mg/dL 1.15* 1.02*   GLUCOSE mg/dL 96 52*   EGFR mL/min/1.73 53.6* 61.9       Results from last 7 days   Lab Units 22  0610 03/10/22  0716   CALCIUM mg/dL 8.0* 9.0       No results found for: HGBA1C, POCGLU    budesonide, 0.5 mg, Nebulization,  Daily  desvenlafaxine, 100 mg, Oral, Q PM  docusate sodium, 100 mg, Oral, BID  flecainide, 50 mg, Oral, BID  fluticasone, 2 spray, Nasal, Daily  gabapentin, 200 mg, Oral, BID  montelukast, 10 mg, Oral, Nightly  mupirocin, , Each Nare, BID  NON FORMULARY, 0.77 mg, Intrathecal Infusion, Daily  pantoprazole, 40 mg, Oral, QAM  polyethylene glycol, 17 g, Oral, Daily  primidone, 50 mg, Oral, BID  rivaroxaban, 10 mg, Oral, Daily  tiZANidine, 8 mg, Oral, Nightly      lactated ringers, 75 mL/hr, Last Rate: Stopped (03/11/22 2305)  sodium chloride, 100 mL/hr, Last Rate: 100 mL/hr (03/12/22 0647)    Diet Regular       Assessment/Plan     Active Hospital Problems    Diagnosis  POA   • **Left knee pain [M25.562]  Unknown   • Left knee pain, unspecified chronicity [M25.562]  Yes   • Anxiety [F41.9]  Yes   • Mild sleep apnea [G47.30]  Yes   • Palpitations [R00.2]  Yes   • Essential tremor [G25.0]  Yes   • Essential (primary) hypertension [I10]  Yes   • Hypoglycemia [E16.2]  Yes      Resolved Hospital Problems   No resolved problems to display.       61 y.o. female who is s/p TOTAL KNEE ARTHROPLASTY.    Blood pressure somewhat low following surgery which is not unexpected.  Unfortunately she did receive her lisinopril last night.  I have stopped it for today and instructed her if she goes home to check her blood pressure tomorrow.  If systolic blood pressure is greater than 120 she can resume lisinopril at that time.  Labs are stable today.  Okay from my perspective for discharge.      Dandy Norris MD  [facility]  03/12/22  13:12 EST

## 2022-03-12 NOTE — DISCHARGE PLACEMENT REQUEST
"Jhonny Mak (61 y.o. Female)             Date of Birth   1960    Social Security Number       Address   [address] [facility] [address] [place] [address]    Home Phone   [phone]    MRN   [id number]       [employer]   None    Marital Status                               Admission Date   3/11/22    Admission Type   Elective    Admitting Provider   Sharad Orourke MD    Attending Provider   Sharad Orourke MD    Department, Room/Bed   [address], [address]/       Discharge Date       Discharge Disposition   Home or Self Care    Discharge Destination                               Attending Provider: Sharad Orourke MD    Allergies: Sulfacetamide, Vancomycin, Duloxetine, Metoprolol, Sulfa Antibiotics    Isolation: None   Infection: None   Code Status: CPR   Advance Care Planning Activity    Ht: 167.6 cm (66\")   Wt: 108 kg (238 lb 15.7 oz)    Admission Cmt: None   Principal Problem: Left knee pain [M25.562]                 Active Insurance as of 3/11/2022     Primary Coverage     Payor Plan Insurance Group Employer/Plan Group    [facility] MEDICARE REPLACEMENT [facility] MEDICARE REPLACEMENT [id number]     Payor Plan Address Payor Plan Phone Number Payor Plan Fax Number Effective Dates    [address] [address]   2/1/2022 - None Entered    [facility] [id number]       Subscriber Name Subscriber Birth Date Member ID       JHONNY MAK [id number]                 Emergency Contacts      (Rel.) Home Phone Work Phone Mobile Phone    MAKBRIE BINGHAM (Spouse) -- -- [phone]              "

## 2022-03-12 NOTE — CONSULTS
Internal Medicine Consult  INTERNAL MEDICINE   [facility]       Patient Identification:  Name: Renetta Mak  Age: 61 y.o.  Sex: female  :  1960  MRN: [id number]                   Primary Care Physician: Ele Laureano MD                               Requesting physician: Dr. Rich  Date of consultation: 3/11/2022    Reason for consultation: Comanagement of medical issues in a patient who is status post left total knee arthroplasty.    History of Present Illness:   Patient is a 61-year-old female who has complicated past medical history including remarkable for anxiety depression high blood pressure, cardiac arrhythmias, irritable bowel syndrome sleep apnea on CPAP as well as asthma and chronic pain with pain pump in place was brought in today for end-stage left total knee osteoarthritis for which she underwent elective left total knee arthroplasty earlier today.  Prior to surgery she did not have any symptoms of chest pain nausea vomiting diarrhea abdominal pain and since surgery she has done well.  Earlier her blood pressure was normal when she arrived to the floor but did drop in the 80s after receiving her blood pressure medications resulting introduction of fluids for systolic blood pressure greater than 100.  Patient is currently free of symptoms and feels fine.  Earlier today she did complain of heartburn to the nursing staff.      Past Medical History:  Past Medical History:   Diagnosis Date   • Anemia    • Anxiety and depression    • Arthritis    • Asthma    • Back pain    • Bronchiectasis (HCC)    • Chronic knee pain after total replacement of right knee joint    • Chronic pain     BACK   • GERD (gastroesophageal reflux disease)    • Hearing loss    • History of bronchitis     JUST FINISHED ANTIBIOTIC 3/9/2022 PT STATES DR RICH IS AWARE   • History of COVID-19     2021 [facility]   • History of stomach ulcers    • IBS (irritable bowel syndrome)    •  Irregular heart beat     HAS SEEN CARDIOLOGY BEFORE AND THEY WERE NOT CONCERNED   • Joint pain    • Left knee pain    • Neuropathy    • On home oxygen therapy     PRN   • Palpitations    • Pneumonia     FREQUENTLY   • Restrictive airway disease    • Scoliosis    • Sleep apnea     CPAP   • Tremors of nervous system    • Urine incontinence      Past Surgical History:  Past Surgical History:   Procedure Laterality Date   • ANTERIOR CERVICAL DISCECTOMY W/ FUSION     • CHOLECYSTECTOMY     • COLONOSCOPY     • HYSTERECTOMY     • KNEE SURGERY Right     right knee partial   • KNEE SURGERY Right     full knee replacement   • LUMBAR SPINE SURGERY      AS A CHILD FOR SCOLIOSIS   • PAIN PUMP INSERTION/REVISION        Home Meds:  Medications Prior to Admission   Medication Sig Dispense Refill Last Dose   • acetaminophen-codeine (TYLENOL with CODEINE #2) 300-15 MG tablet    3/10/2022 at 2200   • albuterol sulfate  (90 Base) MCG/ACT inhaler Inhale 2 puffs Every 4 (Four) Hours As Needed.   3/11/2022 at 0630   • cetirizine (zyrTEC) 10 MG tablet Take 10 mg by mouth Daily As Needed for Allergies.   3/10/2022 at 0800   • Cyanocobalamin (VITAMIN B-12 PO) HOLD PRIOR TO SURG   Past Week at Unknown time   • desvenlafaxine (PRISTIQ) 100 MG 24 hr tablet Take 100 mg by mouth Every Evening.   3/10/2022 at 0800   • flecainide (TAMBOCOR) 50 MG tablet Take 50 mg by mouth 2 (Two) Times a Day.   3/11/2022 at 0630   • fluticasone (FLONASE) 50 MCG/ACT nasal spray 2 sprays into the nostril(s) as directed by provider Daily.   3/11/2022 at 0700   • gabapentin (NEURONTIN) 100 MG capsule Take 200 mg by mouth 2 (Two) Times a Day.   3/11/2022 at 0630   • lisinopril (PRINIVIL,ZESTRIL) 10 MG tablet Take 10 mg by mouth Daily.   3/10/2022 at 0800   • meloxicam (MOBIC) 7.5 MG tablet 1 PO BID (Patient taking differently: Take 15 mg by mouth Daily. HOLDING FOR SURGERY) 60 tablet 1 Past Week at Unknown time   • montelukast (SINGULAIR) 10 MG tablet Take 10 mg  by mouth Every Night.   3/10/2022 at Unknown time   • Morphine Sulfate 0.5 MG/ML solution Infuse  into a venous catheter. PER PAIN PUMP 0.77 MG PER DAY   3/11/2022 at Unknown time   • Multiple Vitamins-Minerals (MULTIVITAMIN ADULT EXTRA C PO) HOLD PRIOR TO SURG   Past Week at Unknown time   • mupirocin (BACTROBAN) 2 % nasal ointment into the nostril(s) as directed by provider. AS DIRECTED PREOP   3/11/2022 at 0700   • omeprazole (priLOSEC) 40 MG capsule Take 40 mg by mouth Daily.   3/11/2022 at 0630   • primidone (MYSOLINE) 50 MG tablet Take 50 mg by mouth 2 (Two) Times a Day.   3/11/2022 at 0630   • tiZANidine (ZANAFLEX) 4 MG tablet Take 8 mg by mouth Every Night.   3/10/2022 at 2200   • vitamin D3 125 MCG (5000 UT) capsule capsule Take  by mouth.   Past Week at Unknown time   • budesonide (PULMICORT) 0.5 MG/2ML nebulizer solution Inhale 0.5 mg Daily.   More than a month at Unknown time   • Narcan 4 MG/0.1ML nasal spray     at pt has no used     Current Meds:     Current Facility-Administered Medications:   •  acetaminophen (TYLENOL) tablet 325 mg, 325 mg, Oral, Q4H PRN, Sharad Orourke MD  •  albuterol (PROVENTIL) nebulizer solution 0.083% 2.5 mg/3mL, 2.5 mg, Nebulization, Q4H PRN, Sharad Orourke MD  •  bisacodyl (DULCOLAX) EC tablet 10 mg, 10 mg, Oral, Daily PRN, Sharad Orourke MD  •  bisacodyl (DULCOLAX) suppository 10 mg, 10 mg, Rectal, Daily PRN, Sharad Orourke MD  •  budesonide (PULMICORT) nebulizer solution 0.5 mg, 0.5 mg, Nebulization, Daily, Sharad Orourke MD  •  ceFAZolin in dextrose (ANCEF) IVPB solution 2 g, 2 g, Intravenous, Q8H, Sharad Orourke MD  •  cetirizine (zyrTEC) tablet 10 mg, 10 mg, Oral, Daily PRN, Sharad Orourke MD  •  desvenlafaxine (PRISTIQ) 24 hr tablet 100 mg, 100 mg, Oral, Q PM, Sharad Orourke MD, 100 mg at 03/11/22 1816  •  docusate sodium (COLACE) capsule 100 mg, 100 mg, Oral, BID, Sharad Orourke MD  •  flecainide (TAMBOCOR) tablet 50 mg, 50 mg, Oral, BID, Sharad Orourke MD  •  [START  ON 3/12/2022] fluticasone (FLONASE) 50 MCG/ACT nasal spray 2 spray, 2 spray, Nasal, Daily, Sharad Orourke MD  •  gabapentin (NEURONTIN) capsule 200 mg, 200 mg, Oral, BID, Sharad Orourke MD  •  lactated ringers infusion, 75 mL/hr, Intravenous, Continuous, Sharad Orourke MD, Last Rate: 75 mL/hr at 03/11/22 1816, 75 mL/hr at 03/11/22 1816  •  lisinopril (PRINIVIL,ZESTRIL) tablet 10 mg, 10 mg, Oral, Nightly, Sharad Orourke MD  •  magnesium hydroxide (MILK OF MAGNESIA) suspension 10 mL, 10 mL, Oral, Daily PRN, Sharad Orourke MD  •  montelukast (SINGULAIR) tablet 10 mg, 10 mg, Oral, Nightly, Sharad Orourke MD  •  mupirocin (BACTROBAN) 2 % nasal ointment, , Each Nare, BID, Sharad Orourke MD  •  NON FORMULARY, 0.77 mg, Intrathecal Infusion, Daily, Sharad Orourke MD, 0.77 mg at 03/11/22 1816  •  ondansetron (ZOFRAN) tablet 4 mg, 4 mg, Oral, Q6H PRN **OR** ondansetron (ZOFRAN) injection 4 mg, 4 mg, Intravenous, Q6H PRN, Sharad Orourke MD  •  oxyCODONE-acetaminophen (PERCOCET) 5-325 MG per tablet 1 tablet, 1 tablet, Oral, Q4H PRN, Sharad Orourke MD  •  oxyCODONE-acetaminophen (PERCOCET) 5-325 MG per tablet 2 tablet, 2 tablet, Oral, Q4H PRN, Sharad Orourke MD  •  [START ON 3/12/2022] pantoprazole (PROTONIX) EC tablet 40 mg, 40 mg, Oral, QAM, Sharad Orourke MD  •  polyethylene glycol (MIRALAX) packet 17 g, 17 g, Oral, Daily, Sharad Orourke MD, 17 g at 03/11/22 1816  •  primidone (MYSOLINE) tablet 50 mg, 50 mg, Oral, BID, Sharad Orourke MD  •  [START ON 3/12/2022] rivaroxaban (XARELTO) tablet 10 mg, 10 mg, Oral, Daily, Sharad Orourke MD  •  tiZANidine (ZANAFLEX) tablet 8 mg, 8 mg, Oral, Nightly, Sharad Orourke MD    Facility-Administered Medications Ordered in Other Encounters:   •  Chlorhexidine Gluconate Cloth 2 % pads, , Apply externally, BID, Sharad Orourke MD  •  mupirocin (BACTROBAN) 2 % nasal ointment, , Nasal, BID, Sharad Orourke MD  Allergies:  Allergies   Allergen Reactions   • Sulfacetamide Hives, Itching and Rash   •  "Vancomycin Itching and Hives     Vancomycin   • Duloxetine Rash   • Metoprolol Other (See Comments) and Unknown - High Severity     Decrease heart rate  weakness    Decrease heart rate   • Sulfa Antibiotics Rash     Social History:   Social History     Tobacco Use   • Smoking status: Never Smoker   • Smokeless tobacco: Never Used   Substance Use Topics   • Alcohol use: Yes     Comment: RARELY      Family History:  Family History   Problem Relation Age of Onset   • Malig Hyperthermia Neg Hx           Review of Systems  See history of present illness and past medical history.    Constitutional: Remarkable for no fever or chills  Cardiovascular: Remarkable for no chest pain or shortness of breath  GI: Remarkable for no nausea vomiting or diarrhea but did have some heartburn earlier.  : Remarkable for no burning urination frequency urgency  Musculoskeletal: Remarkable for left knee discomfort  Neurological: Remarkable for no loss of consciousness or continence.  Remainder of ROS is negative.      Vitals:   /75 (BP Location: Right arm, Patient Position: Lying)   Pulse 67   Temp 98 °F (36.7 °C) (Oral)   Resp 16   Ht 167.6 cm (66\")   Wt 108 kg (238 lb 15.7 oz)   SpO2 91%   BMI 38.57 kg/m²   I/O:     Intake/Output Summary (Last 24 hours) at 3/11/2022 1928  Last data filed at 3/11/2022 1514  Gross per 24 hour   Intake 1300 ml   Output 51 ml   Net 1249 ml     Exam:  Patient is examined using the personal protective equipment as per guidelines from infection control for this particular patient as enacted.  Hand washing was performed before and after patient interaction.  General Appearance:    Alert, cooperative, no distress, appears stated age   Head:    Normocephalic, without obvious abnormality, atraumatic   Eyes:    PERRL, conjunctiva/corneas clear, EOM's intact, both eyes   Ears:    Normal external ear canals, both ears   Nose:   Nares normal, septum midline, mucosa normal, no drainage    or sinus " tenderness   Throat:   Lips, tongue, gums normal; oral mucosa pink and moist   Neck:   Supple, symmetrical, trachea midline, no adenopathy;     thyroid:  no enlargement/tenderness/nodules; no carotid    bruit or JVD   Back:     Symmetric, no curvature, ROM normal, no CVA tenderness   Lungs:     Clear to auscultation bilaterally, respirations unlabored   Chest Wall:    No tenderness or deformity    Heart:    Regular rate and rhythm, S1 and S2 normal, no murmur, rub   or gallop   Abdomen:    Soft nontender   Extremities:  Left knee dressed   Pulses:   Pulses palpable in all extremities; symmetric all extremities   Skin:   Skin color normal, Skin is warm and dry,  no rashes or palpable lesions   Neurologic:  Grossly nonfocal       Data Review:      I reviewed the patient's new clinical results.  Results from last 7 days   Lab Units 03/10/22  0716   WBC 10*3/mm3 7.51   HEMOGLOBIN g/dL 12.3   PLATELETS 10*3/mm3 367     Results from last 7 days   Lab Units 03/10/22  0716   SODIUM mmol/L 140   POTASSIUM mmol/L 3.9   CHLORIDE mmol/L 105   CO2 mmol/L 25.1   BUN mg/dL 16   CREATININE mg/dL 1.02*   CALCIUM mg/dL 9.0   GLUCOSE mg/dL 52*     XR Knee 1 or 2 View Left    Result Date: 3/11/2022   Postsurgical changes.    This report was finalized on 3/11/2022 3:27 PM by Dr. Favio Oneil M.D.      XR Knee 1 or 2 View Left    Result Date: 3/10/2022  FINDINGS AND IMPRESSION: There is no fracture or dislocation. Small joint effusion is present. There is multicompartmental osteoarthritis, most notably within the patellofemoral compartment where it appears to be moderate to severe.  This report was finalized on 3/10/2022 12:57 PM by Dr. Kar Baxter M.D.      Microbiology Results (last 10 days)     Procedure Component Value - Date/Time    COVID PRE-OP / PRE-PROCEDURE SCREENING ORDER (NO ISOLATION) - Swab, Nasopharynx [[id number]]  (Normal) Collected: 03/10/22 0806    Lab Status: Final result Specimen: Swab from Nasopharynx  Updated: 03/10/22 1309    Narrative:      The following orders were created for panel order COVID PRE-OP / PRE-PROCEDURE SCREENING ORDER (NO ISOLATION) - Swab, Nasopharynx.  Procedure                               Abnormality         Status                     ---------                               -----------         ------                     COVID-19,APTIMA PANTHER(...[[id number]]  Normal              Final result                 Please view results for these tests on the individual orders.    COVID-19,APTIMA PANTHER(CORINE),BH SHA/BH TINO, NP/OP SWAB IN UTM/VTM/SALINE TRANSPORT MEDIA,24 HR TAT - Swab, Nasopharynx [[id number]]  (Normal) Collected: 03/10/22 0806    Lab Status: Final result Specimen: Swab from Nasopharynx Updated: 03/10/22 1309     COVID19 Not Detected    Narrative:      Fact sheet for providers: https://www.fda.gov/media/879217/download     Fact sheet for patients: https://www.fda.gov/media/464067/download    Test performed by RT PCR.          Assessment:  Active Hospital Problems    Diagnosis  POA   • **Left knee pain [M25.562]  Unknown   • Left knee pain, unspecified chronicity [M25.562]  Yes   • Anxiety [F41.9]  Yes   • Mild sleep apnea [G47.30]  Yes   • Palpitations [R00.2]  Yes   • Essential tremor [G25.0]  Yes   • Essential (primary) hypertension [I10]  Yes   • Hypoglycemia [E16.2]  Yes       Plan:  · Management of pain, DVT prophylaxis, activity guidelines and discharge disposition in this patient with left total knee arthroplasty per primary orthopedic surgery service.  · Provide her with continuous pulse ox monitoring  · Continue her home regimen  · Watch for hypoglycemia provided with hypoglycemia protocol  · Hold antihypertensive for systolic blood pressure is less than 100  · Provided with as needed medications for heartburn.    Jacklyn Parkinson MD   3/11/2022  19:28 EST  Much of this encounter note is an electronic transcription/translation of spoken language to printed text. The electronic  translation of spoken language may permit erroneous, or at times, nonsensical words or phrases to be inadvertently transcribed; Although I have reviewed the note for such errors, some may still exist

## 2022-03-12 NOTE — THERAPY EVALUATION
Patient Name: Renetta Mak  : 1960    MRN: [id number]                              Today's Date: 3/12/2022       Admit Date: 3/11/2022    Visit Dx:     ICD-10-CM ICD-9-CM   1. Left knee pain, unspecified chronicity  M25.562 719.46     Patient Active Problem List   Diagnosis   • Left knee pain   • Anxiety   • Anemia   • Allergic rhinitis   • Age-related nuclear cataract of both eyes   • Skin tag   • Actinic keratosis   • Seborrheic keratoses   • Achilles tendinitis   • Abnormal mammogram   • Aspiration pneumonitis (HCC)   • Aphasia of unknown origin   • Asthma   • Exudative bronchiolitis   • Mild sleep apnea   • Nocturnal hypoxemia   • Obstructive sleep apnea syndrome   • Atrioventricular block, first degree   • Bacterial pneumonia   • Dysphonia   • Dysphagia   • Dyspareunia   • Degeneration of lumbosacral intervertebral disc with myelopathy   • Degeneration of intervertebral disc of cervical region   • Deficiency of other specified B group vitamins   • COVID-19   • Chronic bronchitis (HCC)   • Chronic back pain   • Cervical pain (neck)   • Cervical disc herniation   • Breast pain   • Bone spur   • Cognitive dysfunction   • Bilateral sensorineural hearing loss   • Bilateral presbyopia   • Bilateral myopia   • Balance disorder   • Hand muscle weakness   • Generalized muscle weakness   • Gastro-esophageal reflux disease without esophagitis   • Feeling of incomplete bladder emptying   • Eyelid myokymia   • Excessive thirst   • Essential tremor   • Essential (primary) hypertension   • Encounter for removal of sutures   • Elevated parathyroid hormone   • Elevated alkaline phosphatase level   • Iron deficiency anemia   • Intermittent urinary stream   • Insufficiency of tear film of both eyes   • Insomnia   • Idiopathic scoliosis   • Hypoglycemia   • Heel pain   • Hearing loss, left   • Wheezing   • Voiding difficulty   • Vocal tremor   • Vaginal dryness   • Urination pain   • Urgency incontinence   • Unstable  knee   • Tremor   • Tinnitus   • Status post bariatric surgery   • Spondylosis of lumbosacral region without myelopathy or radiculopathy   • Sciatica   • Sacroiliac joint pain   • Right maxillary sinusitis   • Recurrent pneumonia   • Recurrent cold sores   • Polyneuropathy   • Pigmented skin lesions   • Palpitations   • Osteoporosis   • Osteoarthritis   • Nonrheumatic mitral valve insufficiency   • Nocturia   • Neuropathy, hereditary sensory   • Orthostatic hypotension   • Obesity   • Neuropathy   • Neuropathic pain   • Myofascial pain syndrome   • Migraine without status migrainosus, not intractable   • Menopausal state   • Melena   • MDD (major depressive disorder)   • Knee stiff   • Irritable bowel syndrome without diarrhea   • Left knee pain, unspecified chronicity     Past Medical History:   Diagnosis Date   • Anemia    • Anxiety and depression    • Arthritis    • Asthma    • Back pain    • Bronchiectasis (HCC)    • Chronic knee pain after total replacement of right knee joint    • Chronic pain     BACK   • GERD (gastroesophageal reflux disease)    • Hearing loss    • History of bronchitis     JUST FINISHED ANTIBIOTIC 3/9/2022 PT STATES DR RICH IS AWARE   • History of COVID-19     2/2021 HOSPITAL FOR 3 DAYS   • History of stomach ulcers    • IBS (irritable bowel syndrome)    • Irregular heart beat     HAS SEEN CARDIOLOGY BEFORE AND THEY WERE NOT CONCERNED   • Joint pain    • Left knee pain    • Neuropathy    • On home oxygen therapy     PRN   • Palpitations    • Pneumonia     FREQUENTLY   • Restrictive airway disease    • Scoliosis    • Sleep apnea     CPAP   • Tremors of nervous system    • Urine incontinence      Past Surgical History:   Procedure Laterality Date   • ANTERIOR CERVICAL DISCECTOMY W/ FUSION     • CHOLECYSTECTOMY     • COLONOSCOPY     • HYSTERECTOMY     • KNEE SURGERY Right     right knee partial   • KNEE SURGERY Right     full knee replacement   • LUMBAR SPINE SURGERY      AS A CHILD FOR  SCOLIOSIS   • PAIN PUMP INSERTION/REVISION        General Information     Row Name 03/12/22 1204          Physical Therapy Time and Intention    Document Type evaluation  -GT     Mode of Treatment physical therapy  -GT     Row Name 03/12/22 1204          General Information    Patient Profile Reviewed yes  -GT     Prior Level of Function independent:  -GT     Existing Precautions/Restrictions fall  -GT     Barriers to Rehab none identified  -GT     Row Name 03/12/22 1204          Living Environment    People in Home spouse;child(camilo), adult  -GT     Row Name 03/12/22 1204          Home Main Entrance    Number of Stairs, Main Entrance three  -GT     Stair Railings, Main Entrance railings safe and in good condition  -GT     Row Name 03/12/22 1204          Stairs Within Home, Primary    Number of Stairs, Within Home, Primary three  -GT     Row Name 03/12/22 1204          Cognition    Orientation Status (Cognition) oriented x 4  -GT     Row Name 03/12/22 1204          Safety Issues, Functional Mobility    Impairments Affecting Function (Mobility) balance;pain;muscle tone abnormal;range of motion (ROM);postural/trunk control;strength  -GT           User Key  (r) = Recorded By, (t) = Taken By, (c) = Cosigned By    Initials Name Provider Type    GT Favio Clinton, PT Physical Therapist               Mobility     Row Name 03/12/22 1205          Bed Mobility    Bed Mobility bed mobility (all) activities;rolling left  -GT     All Activities, [place] (Bed Mobility) minimum assist (75% patient effort);1 person assist  -GT     Assistive Device (Bed Mobility) bed rails  -GT     Comment, (Bed Mobility) HHA  -GT     Row Name 03/12/22 1205          Bed-Chair Transfer    Bed-Chair [place] (Transfers) not tested  -GT     Row Name 03/12/22 1205          Sit-Stand Transfer    Sit-Stand [place] (Transfers) contact guard  -GT     Assistive Device (Sit-Stand Transfers) walker, front-wheeled  -GT     Row Name 03/12/22 1205           Gait/Stairs (Locomotion)    [place] Level (Gait) contact guard  -GT     Assistive Device (Gait) walker, front-wheeled  -GT     Distance in Feet (Gait) 150ft  -GT     Deviations/Abnormal Patterns (Gait) left sided deviations  -GT     Left Sided Gait Deviations decreased knee extension;heel strike decreased  -GT     [place] Level (Stairs) not tested  -GT     Row Name 03/12/22 1205          Mobility    Extremity Weight-bearing Status left lower extremity  -GT     Left Lower Extremity (Weight-bearing Status) weight-bearing as tolerated (WBAT)  -GT           User Key  (r) = Recorded By, (t) = Taken By, (c) = Cosigned By    Initials Name Provider Type    GT Favio Clinton PT Physical Therapist               Obj/Interventions     Row Name 03/12/22 1207          Range of Motion Comprehensive    Comment, General Range of Motion Usual L LE post op limitations,  -GT     Row Name 03/12/22 1207          Strength Comprehensive (MMT)    Comment, General Manual Muscle Testing (MMT) Assessment Usual L LE post op limitations,  -GT     Row Name 03/12/22 1207          Motor Skills    Therapeutic Exercise knee  -GT     Row Name 03/12/22 1207          Knee (Therapeutic Exercise)    Knee (Therapeutic Exercise) AROM (active range of motion)  -GT     Knee AROM (Therapeutic Exercise) left;10 repetitions  -GT     Row Name 03/12/22 1207          Balance    Balance Assessment standing static balance  -GT     Row Name 03/12/22 1207          Sensory Assessment (Somatosensory)    Sensory Assessment (Somatosensory) sensation intact  -GT           User Key  (r) = Recorded By, (t) = Taken By, (c) = Cosigned By    Initials Name Provider Type    GT Favio Clinton PT Physical Therapist               Goals/Plan     Row Name 03/12/22 1209          Bed Mobility Goal 1 (PT)    Activity/Assistive Device (Bed Mobility Goal 1, PT) bed mobility activities, all  -GT     [place] Level/Cues Needed (Bed Mobility Goal 1, PT) independent  -GT      Time Frame (Bed Mobility Goal 1, PT) short term goal (STG);5 days  -GT     Progress/Outcomes (Bed Mobility Goal 1, PT) good progress toward goal  -GT     Row Name 03/12/22 1209          Transfer Goal 1 (PT)    Activity/Assistive Device (Transfer Goal 1, PT) transfers, all  -GT     [place] Level/Cues Needed (Transfer Goal 1, PT) independent  -GT     Time Frame (Transfer Goal 1, PT) short term goal (STG);5 days  -GT     Progress/Outcome (Transfer Goal 1, PT) good progress toward goal  -GT     Row Name 03/12/22 1209          Gait Training Goal 1 (PT)    Activity/Assistive Device (Gait Training Goal 1, PT) gait (walking locomotion)  -GT     [place] Level (Gait Training Goal 1, PT) independent  -GT     Time Frame (Gait Training Goal 1, PT) short term goal (STG);5 days  -GT     Progress/Outcome (Gait Training Goal 1, PT) good progress toward goal  -GT           User Key  (r) = Recorded By, (t) = Taken By, (c) = Cosigned By    Initials Name Provider Type    GT Favio Clinton, PT Physical Therapist               Clinical Impression     Row Name 03/12/22 1208          Pain    Pretreatment Pain Rating 5/10  -GT     Posttreatment Pain Rating 5/10  -GT     Pain Location - Side/Orientation Left  -GT     Pain Location - knee  -GT     Pain Intervention(s) Medication (See MAR)  -GT     Row Name 03/12/22 1208          Plan of Care Review    Plan of Care Reviewed With patient;spouse  -GT     Progress improving  -GT     Row Name 03/12/22 1208          Therapy Assessment/Plan (PT)    Rehab Potential (PT) good, to achieve stated therapy goals  -GT     Criteria for Skilled Interventions Met (PT) yes  -GT     Row Name 03/12/22 1208          Positioning and Restraints    Pre-Treatment Position in bed  -GT     Post Treatment Position bed  -GT     In Bed call light within reach;encouraged to call for assist;exit alarm on;with family/caregiver  -GT           User Key  (r) = Recorded By, (t) = Taken By, (c) = Cosigned By    Initials  Name Provider Type    GT Favio Clinton, PT Physical Therapist               Outcome Measures     Row Name 03/12/22 1212          How much help from another person do you currently need...    Turning from your back to your side while in flat bed without using bedrails? 3  -GT     Moving from lying on back to sitting on the side of a flat bed without bedrails? 3  -GT     Moving to and from a bed to a chair (including a wheelchair)? 3  -GT     Standing up from a chair using your arms (e.g., wheelchair, bedside chair)? 3  -GT     Climbing 3-5 steps with a railing? 2  -GT     To walk in hospital room? 3  -GT     AM-PAC 6 Clicks Score (PT) 17  -GT     Row Name 03/12/22 1212          Functional Assessment    Outcome Measure Options AM-PAC 6 Clicks Basic Mobility (PT)  -GT           User Key  (r) = Recorded By, (t) = Taken By, (c) = Cosigned By    Initials Name Provider Type    GT Favio Clinton, PT Physical Therapist                             Physical Therapy Education                 Title: PT OT SLP Therapies (Done)     Topic: Physical Therapy (Done)     Point: Mobility training (Done)     Learning Progress Summary           Patient Eager, E,TB,D, VU,DU by GT at 3/12/2022 1213   Family Eager, E,TB,D, VU,DU by GT at 3/12/2022 1213                   Point: Home exercise program (Done)     Learning Progress Summary           Patient Eager, E,TB,D, VU,DU by GT at 3/12/2022 1213   Family Eager, E,TB,D, VU,DU by GT at 3/12/2022 1213                   Point: Body mechanics (Done)     Learning Progress Summary           Patient Eager, E,TB,D, VU,DU by GT at 3/12/2022 1213   Family Eager, E,TB,D, VU,DU by GT at 3/12/2022 1213                   Point: Precautions (Done)     Learning Progress Summary           Patient Eager, E,TB,D, VU,DU by GT at 3/12/2022 1213   Family Eager, E,TB,D, VU,DU by GT at 3/12/2022 1213                               User Key     Initials Effective Dates Name Provider Type Discipline    GT 06/16/21 -   Favio Clinton, PT Physical Therapist PT              PT Recommendation and Plan  Planned Therapy Interventions (PT): bed mobility training, gait training, home exercise program, patient/family education, transfer training, strengthening, stair training  Plan of Care Reviewed With: patient, spouse  Progress: improving     Time Calculation:    PT Charges     Row Name 03/12/22 1218             Time Calculation    Start Time 0923  -GT      Stop Time 0943  -GT      Time Calculation (min) 20 min  -GT      PT Non-Billable Time (min) 10 min  -GT      PT Received On 03/12/22  -GT      PT - Next Appointment 03/13/22  -GT      PT Goal Re-Cert Due Date 03/17/22  -GT              Time Calculation- PT    Total Timed Code Minutes- PT 10 minute(s)  -GT            User Key  (r) = Recorded By, (t) = Taken By, (c) = Cosigned By    Initials Name Provider Type    GT Favio Clinton, PT Physical Therapist              Therapy Charges for Today     Code Description Service Date Service Provider Modifiers Qty    88758593797 HC PT EVAL LOW COMPLEXITY 2 3/12/2022 Favio Clinton, PT GP 1    21732347884 HC GAIT TRAINING EA 15 MIN 3/12/2022 Favio Clinton, PT GP 1          PT G-Codes  Outcome Measure Options: AM-PAC 6 Clicks Basic Mobility (PT)  AM-PAC 6 Clicks Score (PT): 17    Favio Clinton PT  3/12/2022

## 2022-03-12 NOTE — DISCHARGE SUMMARY
Patient Name: Renetta Mak  Patient YOB: 1960    Date of Admission:  3/11/2022  Date of Discharge:  3/12/2022  Discharge Diagnosis: [facility] TOTAL KNEE ARTHROPLASTY [59044] (TOTAL KNEE ARTHROPLASTY)  Presenting Problem/History of Present Illness: Left knee pain, unspecified chronicity [M25.562]  Admitting Physician: Dr Sharad Orourke  Consults:   Consults       Date and Time Order Name Status Description    3/11/2022  4:31 PM Inpatient Consult to Hospitalist Completed             DETAILS OF HOSPITAL STAY:  Patient is a 61 y.o. female was admitted to the floor following the above procedure and underwent an uncomplicated hospital stay.  Patient did well with physical therapy and was ambulating well without problems.  On the day of discharge the wound was clean, dry and intact and calf was soft and non tender and Homans sign was negative.  Patient was tolerating  without problems.  Patient will be discharged home.    Condition on Discharge:  Stable    Vital Signs  Temp:  [96.7 °F (35.9 °C)-98.3 °F (36.8 °C)] 96.7 °F (35.9 °C)  Heart Rate:  [50-76] 76  Resp:  [12-16] 12  BP: ()/(53-80) 102/56    LABS:      Admission on 03/11/2022   Component Date Value Ref Range Status    Glucose 03/12/2022 96  65 - 99 mg/dL Final    BUN 03/12/2022 17  8 - 23 mg/dL Final    Creatinine 03/12/2022 1.15 (A) 0.57 - 1.00 mg/dL Final    Sodium 03/12/2022 137  136 - 145 mmol/L Final    Potassium 03/12/2022 4.8  3.5 - 5.2 mmol/L Final    Chloride 03/12/2022 104  98 - 107 mmol/L Final    CO2 03/12/2022 26.0  22.0 - 29.0 mmol/L Final    Calcium 03/12/2022 8.0 (A) 8.6 - 10.5 mg/dL Final    BUN/Creatinine Ratio 03/12/2022 14.8  7.0 - 25.0 Final    Anion Gap 03/12/2022 7.0  5.0 - 15.0 mmol/L Final    eGFR 03/12/2022 53.6 (A) >60.0 mL/min/1.73 Final    National Kidney Foundation and American Society of Nephrology (ASN) Task Force recommended calculation based on the Chronic Kidney Disease Epidemiology Collaboration (CKD-EPI) equation  refit without adjustment for race.    Hemoglobin 03/12/2022 10.1 (A) 12.0 - 15.9 g/dL Final    Hematocrit 03/12/2022 31.1 (A) 34.0 - 46.6 % Final   Pre-Admission Testing on 03/10/2022   Component Date Value Ref Range Status    Glucose 03/10/2022 52 (A) 65 - 99 mg/dL Final    BUN 03/10/2022 16  8 - 23 mg/dL Final    Creatinine 03/10/2022 1.02 (A) 0.57 - 1.00 mg/dL Final    Sodium 03/10/2022 140  136 - 145 mmol/L Final    Potassium 03/10/2022 3.9  3.5 - 5.2 mmol/L Final    Chloride 03/10/2022 105  98 - 107 mmol/L Final    CO2 03/10/2022 25.1  22.0 - 29.0 mmol/L Final    Calcium 03/10/2022 9.0  8.6 - 10.5 mg/dL Final    BUN/Creatinine Ratio 03/10/2022 15.7  7.0 - 25.0 Final    Anion Gap 03/10/2022 9.9  5.0 - 15.0 mmol/L Final    eGFR 03/10/2022 61.9  >60.0 mL/min/1.73 Final    National Kidney Foundation and American Society of Nephrology (ASN) Task Force recommended calculation based on the Chronic Kidney Disease Epidemiology Collaboration (CKD-EPI) equation refit without adjustment for race.    WBC 03/10/2022 7.51  3.40 - 10.80 10*3/mm3 Final    RBC 03/10/2022 4.05  3.77 - 5.28 10*6/mm3 Final    Hemoglobin 03/10/2022 12.3  12.0 - 15.9 g/dL Final    Hematocrit 03/10/2022 37.9  34.0 - 46.6 % Final    MCV 03/10/2022 93.6  79.0 - 97.0 fL Final    MCH 03/10/2022 30.4  26.6 - 33.0 pg Final    MCHC 03/10/2022 32.5  31.5 - 35.7 g/dL Final    RDW 03/10/2022 12.2 (A) 12.3 - 15.4 % Final    RDW-SD 03/10/2022 42.4  37.0 - 54.0 fl Final    MPV 03/10/2022 9.2  6.0 - 12.0 fL Final    Platelets 03/10/2022 367  140 - 450 10*3/mm3 Final    QT Interval 03/10/2022 388  ms Final    COVID19 03/10/2022 Not Detected  Not Detected - Ref. Range Final       No results found.    Discharge Medications     Discharge Medications        New Medications        Instructions Start Date   docusate sodium 100 MG capsule  Commonly known as: COLACE   100 mg, Oral, 2 Times Daily      ondansetron 4 MG tablet  Commonly known as: Zofran   4 mg, Oral, Every 6  Hours PRN      oxyCODONE-acetaminophen 5-325 MG per tablet  Commonly known as: PERCOCET   Take 1-2 tablets by mouth Every 4 (Four) to 6 (Six) Hours As Needed for pain      Xarelto 10 MG tablet  Generic drug: rivaroxaban   10 mg, Oral, Daily             Continue These Medications        Instructions Start Date   acetaminophen-codeine 300-15 MG tablet  Commonly known as: TYLENOL with CODEINE #2   No dose, route, or frequency recorded.      albuterol sulfate  (90 Base) MCG/ACT inhaler  Commonly known as: PROVENTIL HFA;VENTOLIN HFA;PROAIR HFA   2 puffs, Inhalation, Every 4 Hours PRN      budesonide 0.5 MG/2ML nebulizer solution  Commonly known as: PULMICORT   0.5 mg, Inhalation, Daily      cetirizine 10 MG tablet  Commonly known as: zyrTEC   10 mg, Oral, Daily PRN      desvenlafaxine 100 MG 24 hr tablet  Commonly known as: PRISTIQ   100 mg, Oral, Every Evening      flecainide 50 MG tablet  Commonly known as: TAMBOCOR   50 mg, Oral, 2 Times Daily      fluticasone 50 MCG/ACT nasal spray  Commonly known as: FLONASE   2 sprays, Nasal, Daily      gabapentin 100 MG capsule  Commonly known as: NEURONTIN   200 mg, Oral, 2 Times Daily      lisinopril 10 MG tablet  Commonly known as: PRINIVIL,ZESTRIL   10 mg, Oral, Daily      montelukast 10 MG tablet  Commonly known as: SINGULAIR   10 mg, Oral, Nightly      Morphine Sulfate 0.5 MG/ML solution   Intravenous, PER PAIN PUMP 0.77 MG PER DAY      MULTIVITAMIN ADULT EXTRA C PO   HOLD PRIOR TO SURG      Narcan 4 MG/0.1ML nasal spray  Generic drug: naloxone   No dose, route, or frequency recorded.      omeprazole 40 MG capsule  Commonly known as: priLOSEC   40 mg, Oral, Daily      primidone 50 MG tablet  Commonly known as: MYSOLINE   50 mg, Oral, 2 Times Daily      tiZANidine 4 MG tablet  Commonly known as: ZANAFLEX   8 mg, Oral, Nightly      VITAMIN B-12 PO   HOLD PRIOR TO SURG      vitamin D3 125 MCG (5000 UT) capsule capsule   Oral             Stop These Medications       meloxicam 7.5 MG tablet  Commonly known as: MOBIC     mupirocin 2 % nasal ointment  Commonly known as: BACTROBAN              Discharge Instructions: Patient is to continue with physical therapy exercises daily and continue working with the physical therapist as ordered. Patient may weight bear as tolerated. Apply ice regularly. Patient may ice for long periods of time as long as ice is not directly on the skin. Patient instructed on frequent calf pumping exercises.  Patient also instructed on incentive spirometer during hospitalization and encouraged to continue to use at home regularly.    Dressing: The dressing is designed to be left in place until you return to the office in 2 weeks.  The suction unit should stop functioning at 7 days and the green light will switch to yellow.  At that point the suction unit and tubing can be disconnected at the port closest to the dressing.  The suction unit and tubing may be discarded.  You may shower immediately upon return home, you will need to turn the pump off by depressing the orange button once and then you may disconnect the pump and tubing at the connection port.  After showering, shake off the excess water and reattach the tubing.  Restart the pump by depressing the orange button one time and you will notice the green light flashing again.  If the dressing becomes disloged or saturated it should be changed. Please refer to the JARED information sheet if you have any questions about the dressing.  If you have a home health nurse or therapist they can be contacted to assist with dressing change or repair. You may also call the JARED dressing hotline for questions related to the dressing ([phone]). If there still other problems or questions related to the dressing despite these measures then you can contact Patito at our office [phone].  If for some reason the JARED dressing is removed, after 7 days the wound can be gently cleaned with antibacterial soap then  allowed to dry and covered with a dry sterile dressing. The wound should be covered at all times except while showering.  Patient may change dressings daily and prn using sterile 4x4 and paper tape, and should call if any unusual drainage, redness or swelling.*  Follow up appointment in 2 weeks - patient to call the office at [phone] to schedule.  Patient will be discharged on Xarelto as directed    Discharge Diagnosis:    Patient Active Problem List   Diagnosis    Left knee pain    Anxiety    Anemia    Allergic rhinitis    Age-related nuclear cataract of both eyes    Skin tag    Actinic keratosis    Seborrheic keratoses    Achilles tendinitis    Abnormal mammogram    Aspiration pneumonitis (HCC)    Aphasia of unknown origin    Asthma    Exudative bronchiolitis    Mild sleep apnea    Nocturnal hypoxemia    Obstructive sleep apnea syndrome    Atrioventricular block, first degree    Bacterial pneumonia    Dysphonia    Dysphagia    Dyspareunia    Degeneration of lumbosacral intervertebral disc with myelopathy    Degeneration of intervertebral disc of cervical region    Deficiency of other specified B group vitamins    COVID-19    Chronic bronchitis (HCC)    Chronic back pain    Cervical pain (neck)    Cervical disc herniation    Breast pain    Bone spur    Cognitive dysfunction    Bilateral sensorineural hearing loss    Bilateral presbyopia    Bilateral myopia    Balance disorder    Hand muscle weakness    Generalized muscle weakness    Gastro-esophageal reflux disease without esophagitis    Feeling of incomplete bladder emptying    Eyelid myokymia    Excessive thirst    Essential tremor    Essential (primary) hypertension    Encounter for removal of sutures    Elevated parathyroid hormone    Elevated alkaline phosphatase level    Iron deficiency anemia    Intermittent urinary stream    Insufficiency of tear film of both eyes    Insomnia    Idiopathic scoliosis    Hypoglycemia    Heel pain    Hearing loss, left     Wheezing    Voiding difficulty    Vocal tremor    Vaginal dryness    Urination pain    Urgency incontinence    Unstable knee    Tremor    Tinnitus    Status post bariatric surgery    Spondylosis of lumbosacral region without myelopathy or radiculopathy    Sciatica    Sacroiliac joint pain    Right maxillary sinusitis    Recurrent pneumonia    Recurrent cold sores    Polyneuropathy    Pigmented skin lesions    Palpitations    Osteoporosis    Osteoarthritis    Nonrheumatic mitral valve insufficiency    Nocturia    Neuropathy, hereditary sensory    Orthostatic hypotension    Obesity    Neuropathy    Neuropathic pain    Myofascial pain syndrome    Migraine without status migrainosus, not intractable    Menopausal state    Melena    MDD (major depressive disorder)    Knee stiff    Irritable bowel syndrome without diarrhea    Left knee pain, unspecified chronicity       Follow-up Appointments  Future Appointments   Date Time Provider Department Center   3/29/2022 12:45 PM Mark Sandhu PA-C [facility] Craig Reeves, APRN  03/12/22  11:21 EST    Sharad Orourke MD

## 2022-03-14 NOTE — ANESTHESIA POSTPROCEDURE EVALUATION
"Patient: Renetta Mak    Procedure Summary     Date: 03/11/22 Room / Location: [facility] OR  / [facility] MAIN OR    Anesthesia Start: 1247 Anesthesia Stop: 1514    Procedure: TOTAL KNEE ARTHROPLASTY (Left Knee) Diagnosis:       Left knee pain, unspecified chronicity      (Left knee pain, unspecified chronicity [M25.562])    Surgeons: Sharad Orourke MD Provider: Shmuel Chowdhury MD    Anesthesia Type: general with block ASA Status: 4          Anesthesia Type: general with block    Vitals  Vitals Value Taken Time   /72 03/11/22 1631   Temp 36.4 °C (97.6 °F) 03/11/22 1511   Pulse 65 03/11/22 1645   Resp 14 03/11/22 1630   SpO2 97 % 03/11/22 1645   Vitals shown include unvalidated device data.        Post Anesthesia Care and Evaluation    Patient location during evaluation: bedside  Patient participation: complete - patient participated  Level of consciousness: awake and alert  Pain management: adequate  Airway patency: patent  Anesthetic complications: No anesthetic complications    Cardiovascular status: acceptable  Respiratory status: acceptable  Hydration status: acceptable    Comments: /56 (BP Location: Left arm, Patient Position: Lying)   Pulse 76   Temp 35.9 °C (96.7 °F) (Oral)   Resp 12   Ht 167.6 cm (66\")   Wt 108 kg (238 lb 15.7 oz)   SpO2 95%   BMI 38.57 kg/m²       "

## 2022-03-18 ENCOUNTER — TELEPHONE (OUTPATIENT)
Dept: ORTHOPEDIC SURGERY | Facility: HOSPITAL | Age: 62
End: 2022-03-18

## 2022-03-18 DIAGNOSIS — Z96.652 S/P TKR (TOTAL KNEE REPLACEMENT), LEFT: ICD-10-CM

## 2022-03-18 RX ORDER — OXYCODONE HYDROCHLORIDE AND ACETAMINOPHEN 5; 325 MG/1; MG/1
1-2 TABLET ORAL EVERY 4 HOURS PRN
Qty: 40 TABLET | Refills: 0 | Status: SHIPPED | OUTPATIENT
Start: 2022-03-18 | End: 2022-03-24 | Stop reason: SDUPTHER

## 2022-03-18 NOTE — TELEPHONE ENCOUNTER
Thank you for the update.  I really do appreciate that.  I have already refilled her Percocet for her.  Please encourage her to continue to keep the leg wrapped with an Ace bandage from toes to groin to minimize the swelling and to minimize possibility of a DVT.  I do agree that she should transition from a home-based physical therapy program to outpatient since they have more equipment and more facility and she will possibly get more benefit working with the outpatient physical therapy set up.  Thank you

## 2022-03-18 NOTE — TELEPHONE ENCOUNTER
Attempted to speak with Ms. Mak to see how she is doing as she is 1 week SP TKA. Message left, awaiting call back.     Ms. Mak called me back at this time. She said she's hanging in there. She did have some questions regarding her PT and pain medication. She said yesterday was a bad day, today seems to be better but the pain medication isn't doing a lot. She hasn't been able to wean at all and she is taking it every 4 hours. She can't get to that comfortable level. She is out of pain medication now and either needs a refill or she would like to have the medication dosage increased or changed to see if something else will work better. I told her she needed to contact the MD office as I can't fill or change medications but I would let the office know as well to see if we can't get her something today as she is out of meds and it is Friday.     She also had  Some questions regarding PT. [facility]  and though she is able to do the exercises she doesn't feel that she is getting much from it. She has one week left, but would like to go ahead and transition to OP PT thinking that will be more beneficial for her. I told her to contact the PT and see if there are any openings for OP PT before she makes that transition. She voiced understanding.     Otherwise everything else is going well. She is walking, and doing her exercises. Her dressing looks good no issues with incision. She doesn't have any other questions for me at this time. Ms. Mak has my contact information should she need anything. She voiced understanding.

## 2022-03-18 NOTE — TELEPHONE ENCOUNTER
PATIENT CALLED THIS MORNING AND STATES THAT SHE IS ALMOST COMPLETELY OUT OF PAIN MEDICATION AND THAT SHE IS TAKING 2 EVERY 4 HOURS AND HER PAIN IS NO WHERE NEAR BEING CONTROLLED. SHE IS REQUESTING A REFILL ON OXCYCODONE 5/325MG 1-2 TABLETS EVERY 4-6 HOURS AS NEEDED FOR PAIN #40 WITH NO REFILLS. THIS WAS SENT TO DR. RICH FOR SIGN-OFF ELECTRONICALLY/RJ

## 2022-03-24 DIAGNOSIS — Z96.652 S/P TKR (TOTAL KNEE REPLACEMENT), LEFT: ICD-10-CM

## 2022-03-25 DIAGNOSIS — Z96.652 S/P TKR (TOTAL KNEE REPLACEMENT), LEFT: Primary | ICD-10-CM

## 2022-03-27 RX ORDER — OXYCODONE HYDROCHLORIDE AND ACETAMINOPHEN 5; 325 MG/1; MG/1
1-2 TABLET ORAL EVERY 4 HOURS PRN
Qty: 40 TABLET | Refills: 0 | Status: SHIPPED | OUTPATIENT
Start: 2022-03-27 | End: 2022-03-31 | Stop reason: SDUPTHER

## 2022-03-29 ENCOUNTER — HOSPITAL ENCOUNTER (OUTPATIENT)
Dept: GENERAL RADIOLOGY | Facility: HOSPITAL | Age: 62
Discharge: HOME | End: 2022-03-29
Admitting: PHYSICIAN ASSISTANT

## 2022-03-29 ENCOUNTER — OFFICE VISIT (OUTPATIENT)
Dept: ORTHOPEDIC SURGERY | Facility: CLINIC | Age: 62
End: 2022-03-29

## 2022-03-29 VITALS — TEMPERATURE: 97.8 F | HEIGHT: 67 IN | WEIGHT: 229 LBS | BODY MASS INDEX: 35.94 KG/M2

## 2022-03-29 DIAGNOSIS — Z96.652 S/P TKR (TOTAL KNEE REPLACEMENT), LEFT: ICD-10-CM

## 2022-03-29 DIAGNOSIS — Z96.652 S/P TKR (TOTAL KNEE REPLACEMENT), LEFT: Primary | ICD-10-CM

## 2022-03-29 PROCEDURE — 99024 POSTOP FOLLOW-UP VISIT: CPT | Performed by: PHYSICIAN ASSISTANT

## 2022-03-29 PROCEDURE — 73562 X-RAY EXAM OF KNEE 3: CPT

## 2022-03-29 NOTE — PROGRESS NOTES
POST OPERATIVE FOLLOW UP ([facility])      NAME: Renetta Mak           : 1960    MRN: [id number]      Chief Complaint   Patient presents with   • Left Knee - Post-op, Follow-up   • Wound Check     Date of Surgery: 3/11/2022  ?     HPI  Renetta Mak presents for 2.5 week postoperative follow up s/p left total knee arthroplasty performed by Sharad Orourke MD. The patient has had a relatively normal postoperative course.  The patient has had improving normal postoperative pain.  The patient has had no issues with the wound.   She recently received a refill on pain medication.  She states she did go the entire weekend without it because it was not signed/sent in on Friday.  She reports she has been having difficulty getting the pain completely under control and reports she is taking 2 tablets twice daily and sometimes at bedtime.  Her prescription is prescribed for 1 to 2 tablets every 4-6 hours so we did discuss increasing the pain medication. She states she received a refill on her xarelto as well.       Physical Exam  General: alert, appears stated age, cooperative and no distress  Incision/Skin: healing well, no drainage, no erythema, no seroma, no swelling, incision well approximated.  There is already great healing of the majority of the incision with exception to the most proximal and distal ends of the incision.  Musculoskeletal:   Left knee  Calf is soft and nontender with a negative Homans sign. Appropriate amounts of swelling and bruising are noted.  There is no clicking, popping or catching. There is no instability of the components.   Anterior and posterior drawer signs are negative.   Dorsalis pedis and posterior tibial artery pulses are palpable.   Common peroneal nerve function is well preserved.   Range of motion is -3-90 degrees of flexion.  Gait is cautious but otherwise fairly normal.       Diagnostic Data:  LEFT knee xrays  weightbearing/standing 3 views were ordered by Mark Almonte  YONY Sandhu. Performed at [facility] on 3/29/2022. Images were independently viewed and interpreted by myself, my impression as follows:  Findings: Well positioned implant with good cement mantle  Bony Lesion: No  Soft tissues: within normal limits  Joint spaces: WNL  Hardware appropriately positioned: yes  Prior studies available for comparison: yes          Assessment/Plan   Assessment:    1. S/P TKR (total knee replacement), left          Plan:       • Wound care instructions given  • Ice and/or modalities as beneficial  • Watch for signs and symptoms of infection  • Weight bearing parameters reviewed  • Take prescribed medications as instructed, but only as tolerated  • Aftercare and dental prophylaxis for joint replacement surgery.  • Discussion of orthopaedic goals and activities and patient and/or guardian expressed appreciation.  • Follow up in 4-6 weeks  • Discussed stopping the extra rx of xarelto and using ASA but due to her prior gastric surgery and her surgeon not wanting her on nsaids or asa, she will complete the xarelto and not do ASA.         There are no Patient Instructions on file for this visit.       Mark Sandhu PA-C  03/29/2022     Electronically signed by Mark Sandhu PA-C, 03/29/22, 1:07 PM EDT.    EMR Dragon/Transcription disclaimer:  Much of this encounter note is an electronic transcription/translation of spoken language to printed text. The electronic translation of spoken language may permit erroneous, or at times, nonsensical words or phrases to be inadvertently transcribed; Although I have reviewed the note for such errors, some may still exist.

## 2022-03-31 DIAGNOSIS — Z96.652 S/P TKR (TOTAL KNEE REPLACEMENT), LEFT: ICD-10-CM

## 2022-03-31 RX ORDER — OXYCODONE HYDROCHLORIDE AND ACETAMINOPHEN 5; 325 MG/1; MG/1
1-2 TABLET ORAL EVERY 4 HOURS PRN
Qty: 40 TABLET | Refills: 0 | Status: SHIPPED | OUTPATIENT
Start: 2022-03-31 | End: 2022-04-08 | Stop reason: SDUPTHER

## 2022-04-04 ENCOUNTER — TELEPHONE (OUTPATIENT)
Dept: ORTHOPEDIC SURGERY | Facility: CLINIC | Age: 62
End: 2022-04-04

## 2022-04-04 NOTE — TELEPHONE ENCOUNTER
PATIENT HAS BEEN ADVISED FIVE DAYS AFTER COMPLETION OF HER ANTICOAGULANT FOLLOWING SURGERY SHE MAY START BACK ON HER VITAMINS AND THE MELOXICAM.

## 2022-04-04 NOTE — TELEPHONE ENCOUNTER
Caller: JHONNY CHÁVEZ    Relationship: SELF    Best call back number:     What is the best time to reach you: ANY     Who are you requesting to speak with (clinical staff, provider,  specific staff member): CLINICAL    What was the call regarding: PATIENT WANTS TO ASK SOME QUESTIONS IN REGARDS TO WHEN THEY CAN TAKE VITAMINS AND SUPPLEMENTS SINCE SURGERY    Do you require a callback: YES

## 2022-04-08 DIAGNOSIS — Z96.652 S/P TKR (TOTAL KNEE REPLACEMENT), LEFT: ICD-10-CM

## 2022-04-08 RX ORDER — OXYCODONE HYDROCHLORIDE AND ACETAMINOPHEN 5; 325 MG/1; MG/1
1 TABLET ORAL EVERY 6 HOURS PRN
Qty: 40 TABLET | Refills: 0 | Status: SHIPPED | OUTPATIENT
Start: 2022-04-08 | End: 2022-05-26

## 2022-04-18 ENCOUNTER — TELEPHONE (OUTPATIENT)
Dept: ORTHOPEDIC SURGERY | Facility: CLINIC | Age: 62
End: 2022-04-18

## 2022-04-18 DIAGNOSIS — T81.30XA WOUND DEHISCENCE: ICD-10-CM

## 2022-04-18 DIAGNOSIS — Z96.652 S/P TKR (TOTAL KNEE REPLACEMENT), LEFT: Primary | ICD-10-CM

## 2022-04-18 RX ORDER — DOXYCYCLINE HYCLATE 100 MG/1
CAPSULE ORAL
Qty: 28 CAPSULE | Refills: 0 | Status: SHIPPED | OUTPATIENT
Start: 2022-04-18 | End: 2022-05-26

## 2022-04-18 NOTE — TELEPHONE ENCOUNTER
Patient came back to the office for a wound check.  We removed another stitch and applied a silver antimicrobial dressing. There are no signs of infection.  Per Dr. Orourke's protocol, we will try a rx of Doxycycline 100 mg one po bid for 14 days to aid in the healing process.  Patient will return next Monday for another check and she has a follow up with  on May 5th.

## 2022-04-25 ENCOUNTER — DOCUMENTATION (OUTPATIENT)
Dept: ORTHOPEDIC SURGERY | Facility: CLINIC | Age: 62
End: 2022-04-25

## 2022-04-25 NOTE — PROGRESS NOTES
Patient came into the office for a wound check.  She is s/p TKA.  She had a small wound dehiscence due to a residual suture.  The wound is clean and it is healing.  We are applying an antimicrobial silver dressing to the wound and bordered gauze.  Patient remains on Doxycycline to aid in healing.  There are no signs of infection.  Patient has been advised to return this week on Wednesday to see Dr. Orourke.

## 2022-04-27 ENCOUNTER — OFFICE VISIT (OUTPATIENT)
Dept: ORTHOPEDIC SURGERY | Facility: CLINIC | Age: 62
End: 2022-04-27

## 2022-04-27 DIAGNOSIS — T81.30XA WOUND DEHISCENCE: ICD-10-CM

## 2022-04-27 DIAGNOSIS — Z96.652 S/P TKR (TOTAL KNEE REPLACEMENT), LEFT: Primary | ICD-10-CM

## 2022-04-27 PROCEDURE — 99024 POSTOP FOLLOW-UP VISIT: CPT | Performed by: ORTHOPAEDIC SURGERY

## 2022-05-02 ENCOUNTER — OUTSIDE FACILITY SERVICE (OUTPATIENT)
Dept: ORTHOPEDIC SURGERY | Facility: CLINIC | Age: 62
End: 2022-05-02

## 2022-05-02 DIAGNOSIS — M25.362 INSTABILITY OF LEFT KNEE JOINT: Primary | ICD-10-CM

## 2022-05-02 PROCEDURE — 13160 SEC CLSR SURG WND/DEHSN XTN: CPT | Performed by: ORTHOPAEDIC SURGERY

## 2022-05-02 RX ORDER — HYDROCODONE BITARTRATE AND ACETAMINOPHEN 5; 325 MG/1; MG/1
TABLET ORAL
Qty: 30 TABLET | Refills: 0 | Status: SHIPPED | OUTPATIENT
Start: 2022-05-02 | End: 2022-05-26

## 2022-05-05 ENCOUNTER — TELEPHONE (OUTPATIENT)
Dept: ORTHOPEDIC SURGERY | Facility: CLINIC | Age: 62
End: 2022-05-05

## 2022-05-05 ENCOUNTER — OFFICE VISIT (OUTPATIENT)
Dept: ORTHOPEDIC SURGERY | Facility: CLINIC | Age: 62
End: 2022-05-05

## 2022-05-05 DIAGNOSIS — Z96.652 S/P TKR (TOTAL KNEE REPLACEMENT), LEFT: Primary | ICD-10-CM

## 2022-05-05 PROCEDURE — 99024 POSTOP FOLLOW-UP VISIT: CPT | Performed by: ORTHOPAEDIC SURGERY

## 2022-05-05 RX ORDER — DIPHENHYDRAMINE, LIDOCAINE, NYSTATIN
5 KIT ORAL 4 TIMES DAILY
Qty: 60 ML | Refills: 1 | Status: SHIPPED | OUTPATIENT
Start: 2022-05-05 | End: 2022-05-05 | Stop reason: SDUPTHER

## 2022-05-05 RX ORDER — DIPHENHYDRAMINE, LIDOCAINE, NYSTATIN
5 KIT ORAL 4 TIMES DAILY
Qty: 60 ML | Refills: 1 | Status: SHIPPED | OUTPATIENT
Start: 2022-05-05 | End: 2022-05-26

## 2022-05-05 NOTE — PROGRESS NOTES
POST OP TOTAL GLOBAL      NAME: Renetta Mak           : 1960    MRN: [id number]    Chief Complaint   Patient presents with   • Left Knee - Post-op     I&D 22 L TKA SX 22       Date of Surgery: 22 & L TKA 22  ?  HPI:   Patient returns today for 3 day follow up of left total knee arthroplasty Incision(s) healing nicely/as expected with no signs of infection. Patient reports doing well with no unusual complaints. No fevers, rigors or chills. Appears to be progressing appropriately. Patient is on appropriate anticoagulation.       Ortho Exam:   Left knee.The patient is status post total knee arthroplasty postoperative 7 week(s). Incision is clean. Calf is soft and nontender. Homans sign is negative. There is no clicking, popping or catching. Anterior and posterior drawer signs are negative.  There is no instability of the components. Appropriate amounts of swelling and bruising are noted. Dorsalis pedis and posterior tibial artery pulses are palpable. Common peroneal nerve function is well preserved. Range of motion is from 0-110 degrees of flexion. Gait is cautious but otherwise fairly normal. There is no evidence of a deep seated joint infection.      Diagnostic Studies:  no diagnostic testing performed this visit        Assessment:  Diagnoses and all orders for this visit:    1. S/P TKR (total knee replacement), left (Primary)    Other orders  -     Discontinue: nystatin susp + lidocaine viscous (MAGIC MOUTHWASH) oral suspension; Swish and swallow 5 mL 4 (Four) Times a Day.  Dispense: 60 mL; Refill: 1            Plan   · Incision care  · To use ACE wrap/ÓSCAR stocking  · Continue ice to joint   · Stretching and strengthening exercises  · Gentle active ROM.  · Tablet doxycycline 100 mg tab 1 p.o. twice daily for antibiotic prophylaxis.  · Falls precautions  · You may now resume any prescription medications you were taking prior to surgery  · Continue with lifelong antibiotic  prophylaxis with dental procedures following total joint replacement.  · Follow up as scheduled     Date of encounter: 05/05/2022   Sharad Orourke MD

## 2022-05-05 NOTE — TELEPHONE ENCOUNTER
PATIENT CALLED AND STATED THAT [facility] HAS CALLED HER AND STATED THAT THEY CAN'T DO COMPOUNDING SO HER PRESCRIPTION WOULD NEED TO GO TO [facility] IN [place] UNLESS DR. RICH WANTS TO CHANGE  THE PRESCRIPTION

## 2022-05-26 ENCOUNTER — OFFICE VISIT (OUTPATIENT)
Dept: ORTHOPEDIC SURGERY | Facility: CLINIC | Age: 62
End: 2022-05-26

## 2022-05-26 DIAGNOSIS — Z96.652 S/P TKR (TOTAL KNEE REPLACEMENT), LEFT: Primary | ICD-10-CM

## 2022-05-26 PROCEDURE — 99024 POSTOP FOLLOW-UP VISIT: CPT | Performed by: ORTHOPAEDIC SURGERY

## 2022-05-26 NOTE — PROGRESS NOTES
"POST OP TOTAL GLOBAL      NAME: Renetta Mak           : 1960    MRN: [id number]    Chief Complaint   Patient presents with   • Left Knee - Post-op   • Wound Check   • Suture / Staple Removal       Date of Surgery: 2022 and I&D 22  ?  HPI:   Patient returns today for 11 week follow up of left total knee arthroplasty Incision(s) healing nicely/as expected with no signs of infection. Patient reports doing well with no unusual complaints. No fevers, rigors or chills. Appears to be progressing appropriately. Patient is on appropriate anticoagulation.  She has done very well postoperatively.  She states \"my new knee is absolutely also.  This is the best experience with orthopedic surgery I have ever had \".  She does have a right knee which was performed in [facility] but is not doing very well and has clinical presentation with instability.  She might require surgery on the right knee after she has fully recuperated from the left side.      Ortho Exam:   Left knee.The patient is status post total knee arthroplasty postoperative 11 week(s). Incision is clean. Calf is soft and nontender. Homans sign is negative. There is no clicking, popping or catching. Anterior and posterior drawer signs are negative.  There is no instability of the components. Appropriate amounts of swelling and bruising are noted. Dorsalis pedis and posterior tibial artery pulses are palpable. Common peroneal nerve function is well preserved. Range of motion is from 0-125 degrees of flexion. Gait is cautious but otherwise fairly normal. There is no evidence of a deep seated joint infection.      Diagnostic Studies:  no diagnostic testing performed this visit        Assessment:  Diagnoses and all orders for this visit:    1. S/P TKR (total knee replacement), left (Primary)  -     Suture Removal            Plan   · Incision care  · To use ACE wrap/ÓSCAR stocking  · Continue ice to joint   · She will eventually need a right " total knee revision  · Stretching and strengthening exercises  · Aggressive ROM  · Falls precautions  · You may now resume any prescription medications you were taking prior to surgery  · Continue with lifelong antibiotic prophylaxis with dental procedures following total joint replacement.  · Follow up in 3 month(s)    Date of encounter: 05/26/2022   Sharad Orourke MD    Answers for HPI/ROS submitted by the patient on 5/19/2022  Please describe your symptoms.: Suture removal  Have you had these symptoms before?: No  How long have you been having these symptoms?: Greater than 2 weeks  Please list any medications you are currently taking for this condition.: None  Please describe any probable cause for these symptoms. : Infected suture from knee replacement  What is the primary reason for your visit?: Other

## 2022-06-22 RX ORDER — MELOXICAM 15 MG/1
7.5 TABLET ORAL DAILY
Qty: 90 TABLET | Refills: 0 | Status: SHIPPED | OUTPATIENT
Start: 2022-06-22 | End: 2023-01-05

## 2022-08-25 ENCOUNTER — OFFICE VISIT (OUTPATIENT)
Dept: ORTHOPEDIC SURGERY | Facility: CLINIC | Age: 62
End: 2022-08-25

## 2022-08-25 DIAGNOSIS — Z96.652 S/P TKR (TOTAL KNEE REPLACEMENT), LEFT: Primary | ICD-10-CM

## 2022-08-25 DIAGNOSIS — M25.361 INSTABILITY OF RIGHT KNEE JOINT: ICD-10-CM

## 2022-08-25 PROCEDURE — 99214 OFFICE O/P EST MOD 30 MIN: CPT | Performed by: ORTHOPAEDIC SURGERY

## 2022-09-08 DIAGNOSIS — M54.50 LUMBAR PAIN: Primary | ICD-10-CM

## 2022-11-08 ENCOUNTER — TELEPHONE (OUTPATIENT)
Dept: ORTHOPEDIC SURGERY | Facility: CLINIC | Age: 62
End: 2022-11-08

## 2022-11-08 NOTE — TELEPHONE ENCOUNTER
Hub staff attempted to follow warm transfer process and was unsuccessful    Caller: JHONNY CHÁVEZ    Relationship to patient: SELF    Best call back number: [phone]    Patient is needing: NEEDS TO RESCHEDULE 11/17 APPT, SHE IS SUPPOSED TO HAVE AN MRI THIS WEEK AND HE SAID THAT HE WANTED TO SEE HER VERY SOON AFTER IT WAS COMPLETED.

## 2022-11-08 NOTE — TELEPHONE ENCOUNTER
CALLED PATIENT TO RESCHEDULE APPOINTMENT TO NEXT AVAILABLE IN February, BUT PATIENT WANTS TO KEEP APPOINTMENT ON 11/17/22

## 2023-01-05 RX ORDER — MELOXICAM 15 MG/1
TABLET ORAL
Qty: 45 TABLET | Refills: 0 | Status: SHIPPED | OUTPATIENT
Start: 2023-01-05 | End: 2023-03-21

## 2023-03-21 ENCOUNTER — TELEPHONE (OUTPATIENT)
Dept: ORTHOPEDIC SURGERY | Facility: CLINIC | Age: 63
End: 2023-03-21
Payer: MEDICARE

## 2023-03-21 RX ORDER — MELOXICAM 7.5 MG/1
7.5 TABLET ORAL DAILY
Qty: 90 TABLET | Refills: 0 | Status: SHIPPED | OUTPATIENT
Start: 2023-03-21

## 2023-03-21 NOTE — TELEPHONE ENCOUNTER
[facility] CALLED REQUESTING REFILL ON PATIENT'S MELOXICAM 15 MG  1/2 TABLET PER DAY.    PHONE NUMBER [phone]  FAX NUMBER [phone]

## 2023-04-21 RX ORDER — MELOXICAM 7.5 MG/1
7.5 TABLET ORAL DAILY
Qty: 90 TABLET | Refills: 1 | Status: SHIPPED | OUTPATIENT
Start: 2023-04-21

## (undated) DEVICE — ANTIBACTERIAL UNDYED BRAIDED (POLYGLACTIN 910), SYNTHETIC ABSORBABLE SUTURE: Brand: COATED VICRYL

## (undated) DEVICE — UNDERCAST PADDING: Brand: DEROYAL

## (undated) DEVICE — GLV SURG SIGNATURE ESSENTIAL PF LTX SZ8.5

## (undated) DEVICE — GLV SURG PREMIERPRO ORTHO LTX PF SZ9 BRN

## (undated) DEVICE — PK KN TOTL 40

## (undated) DEVICE — BNDG ELAS ELITE V/CLOSE 6IN 5YD LF STRL

## (undated) DEVICE — INTEGUSEAL MICROBIAL SEALANT: Brand: AVANOS

## (undated) DEVICE — BNDG ELAS CO-FLEX SLF ADHR 6IN 5YD LF STRL

## (undated) DEVICE — DUAL CUT SAGITTAL BLADE

## (undated) DEVICE — STERILE PATIENT PROTECTIVE PAD FOR IMP® KNEE POSITIONERS & COHESIVE WRAP (10 / CASE): Brand: DE MAYO KNEE POSITIONER®

## (undated) DEVICE — TBG PENCL TELESCP MEGADYNE SMOKE EVAC 10FT

## (undated) DEVICE — SYS CLS SKIN PREMIERPRO EXOFINFUSION 22CM

## (undated) DEVICE — APPL CHLORAPREP HI/LITE 26ML ORNG

## (undated) DEVICE — OPTIFOAM GENTLE SA, POSTOP, 4X12: Brand: MEDLINE

## (undated) DEVICE — TRAP FLD MINIVAC MEGADYNE 100ML

## (undated) DEVICE — MAT FLR ABSORBENT LG 4FT 10 2.5FT

## (undated) DEVICE — PIN DRL NOHEAD TROC 3.2X75MM